# Patient Record
Sex: MALE | Race: WHITE | URBAN - METROPOLITAN AREA
[De-identification: names, ages, dates, MRNs, and addresses within clinical notes are randomized per-mention and may not be internally consistent; named-entity substitution may affect disease eponyms.]

---

## 2017-04-19 ENCOUNTER — TRANSFERRED RECORDS (OUTPATIENT)
Dept: HEALTH INFORMATION MANAGEMENT | Facility: CLINIC | Age: 67
End: 2017-04-19

## 2017-04-25 ENCOUNTER — TRANSFERRED RECORDS (OUTPATIENT)
Dept: HEALTH INFORMATION MANAGEMENT | Facility: CLINIC | Age: 67
End: 2017-04-25

## 2017-04-26 ENCOUNTER — PRE VISIT (OUTPATIENT)
Dept: OTOLARYNGOLOGY | Facility: CLINIC | Age: 67
End: 2017-04-26

## 2017-04-26 NOTE — TELEPHONE ENCOUNTER
1.  Date/reason for appt: 5/18/17 Cholesteatoma of R  2.  Referring provider: KAVON DEE   3.  Call to patient (Yes / No - short description): no, referral   4.  Previous care at / records requested from: [facility]   5.  Other: Records received from [facility].   Included  Office notes: 9/21/15, 11/21/16, 12/12/16, 4/19/17  Other: labs

## 2017-05-16 DIAGNOSIS — H71.90 CHOLESTEATOMA: Primary | ICD-10-CM

## 2017-05-17 NOTE — TELEPHONE ENCOUNTER
Records received from [facility].   Included  Op/Procedure notes: right ear surgery on 4/11/05  Other: audiogram on 3/16/05

## 2017-05-18 ENCOUNTER — OFFICE VISIT (OUTPATIENT)
Dept: AUDIOLOGY | Facility: CLINIC | Age: 67
End: 2017-05-18

## 2017-05-18 ENCOUNTER — OFFICE VISIT (OUTPATIENT)
Dept: OTOLARYNGOLOGY | Facility: CLINIC | Age: 67
End: 2017-05-18

## 2017-05-18 VITALS — BODY MASS INDEX: 32.07 KG/M2 | WEIGHT: 224 LBS | HEIGHT: 70 IN

## 2017-05-18 DIAGNOSIS — H90.5 SENSORINEURAL HEARING LOSS OF LEFT EAR: Primary | ICD-10-CM

## 2017-05-18 DIAGNOSIS — H90.71 MIXED HEARING LOSS OF RIGHT EAR: ICD-10-CM

## 2017-05-18 DIAGNOSIS — H92.11 OTORRHEA, RIGHT: ICD-10-CM

## 2017-05-18 DIAGNOSIS — H71.91 CHOLESTEATOMA, RIGHT: Primary | ICD-10-CM

## 2017-05-18 DIAGNOSIS — H71.90 CHOLESTEATOMA: ICD-10-CM

## 2017-05-18 PROBLEM — C44.621 SQUAMOUS CELL CARCINOMA OF HAND: Status: ACTIVE | Noted: 2017-01-05

## 2017-05-18 RX ORDER — WARFARIN SODIUM 2 MG/1
TABLET ORAL
COMMUNITY
Start: 2017-04-12

## 2017-05-18 RX ORDER — CEFADROXIL 500 MG/1
500 CAPSULE ORAL
COMMUNITY
Start: 2016-12-22 | End: 2017-05-25

## 2017-05-18 RX ORDER — CHLORAL HYDRATE 500 MG
2 CAPSULE ORAL EVERY EVENING
COMMUNITY

## 2017-05-18 RX ORDER — ASPIRIN 81 MG/1
81 TABLET, CHEWABLE ORAL EVERY EVENING
COMMUNITY
Start: 2016-12-13

## 2017-05-18 RX ORDER — SIMVASTATIN 40 MG
TABLET ORAL
COMMUNITY
Start: 2016-12-13

## 2017-05-18 RX ORDER — AMLODIPINE BESYLATE AND BENAZEPRIL HYDROCHLORIDE 2.5; 1 MG/1; MG/1
1 CAPSULE ORAL EVERY EVENING
COMMUNITY
Start: 2016-12-13

## 2017-05-18 RX ORDER — ACETAMINOPHEN 500 MG
500-1000 TABLET ORAL EVERY 4 HOURS PRN
COMMUNITY
Start: 2013-03-15

## 2017-05-18 RX ORDER — CHOLECALCIFEROL (VITAMIN D3) 50 MCG
2000 TABLET ORAL EVERY EVENING
COMMUNITY
Start: 2017-05-10

## 2017-05-18 RX ORDER — SILDENAFIL 100 MG/1
100 TABLET, FILM COATED ORAL
COMMUNITY
End: 2017-05-25

## 2017-05-18 ASSESSMENT — PAIN SCALES - GENERAL: PAINLEVEL: NO PAIN (0)

## 2017-05-18 NOTE — NURSING NOTE
Teaching Flowsheet - ENT   Relevant Diagnosis: cholesteatoma  Teaching Topic:right conversion or radical mastoidectomy  Person(s) involved in teaching:  Patient- wife     Motivation Level:  Asks Questions:   Yes  Eager to Learn:   Yes  Cooperative:   Yes  Receptive (willing/able to accept information):   Yes  Comments: Reviewed pre-op H and P,  NPO prior to  surgery,  pre-op scrub (given Hibiclens)  Reviewed post-op  cares including  ear/wound care, activity and pain.     Patient demonstrates understanding of the following:  Reason for the appointment, diagnosis and treatment plan:   Yes  Knowledge of proper use of medications and conditions for which they are ordered (with special attention to potential side effects or drug interactions):  stop aspirin products 1 week before surgery Yes. Pt on coumadin- pt will update  PCP - this is who orders/manages medication.   Which situations necessitate calling provider and whom to contact:   Yes  Nutritional needs and diet plan:   Yes  Pain management techniques:   Yes  Patient instructed on hand hygiene:  Yes  How and/when to access community resources:   Yes     Infection Prevention:  Patient   demonstrates understanding of the following:  Surgical procedure site care taught Yes  Signs and symptoms of infection taught Yes  Wound care taught Yes  Instructional Materials Used/Given: pre- op booklet, ear surgery  handout and verbal  Instruction.

## 2017-05-18 NOTE — LETTER
5/18/2017       RE: Ed Cain  [address] [facility] [address]     Dear Colleague,    Thank you for referring your patient, Ed Cain, to the [facility] EAR NOSE AND THROAT at [facility]. Please see a copy of my visit note below.    Ed Cain is seen in consultation from Dr. Harley Rey.  He is a 66-year-old male with history of right-sided ear disease. He had a mastoidectomy done some time in childhood. He then had a revision to canal wall down mastoidectomy with PORP placement in 2005 with Dr. Camarena. Since then, he gets twice yearly mastoid bowl cleanings. In the last 7-8 months, he has had more pain with cleanings. He feels like around this time, he also started to notice some balance disturbance when turning quickly. He does not have true vertigo. He denies having any otorrhea.     PAST MEDICAL HISTORY:  1. Hypertension  2. Cerebral aneurysms s/p coiling    PAST SURGICAL HISTORY:   1. Mastoidectomy in childhood, revision to canal wall down in 2005 with Dr. Camarena (PORP placed)  2. Aneurysm coiling    MEDICATIONS:  Current Outpatient Prescriptions   Medication     acetaminophen (TYLENOL) 500 MG tablet     amLODIPine-benazepril (LOTREL) 2.5-10 MG per capsule     aspirin 81 MG chewable tablet     Cholecalciferol (VITAMIN D) 2000 UNITS tablet     fish oil-omega-3 fatty acids 1000 MG capsule     warfarin (COUMADIN) 2 MG tablet     sildenafil (REVATIO/VIAGRA) 100 MG cap/tab     simvastatin (ZOCOR) 40 MG tablet     cefadroxil (DURICEF) 500 MG capsule     No current facility-administered medications for this visit.      SOCIAL HISTORY: he is  and accompanied by his wife today. They live in [place], [place]. 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    FAMILY HISTORY: no history of ear disease or hearing loss.    Patient Supplied Answers to Review of Systems  [facility] ENT ROS 5/18/2017   Ears, Nose, Throat Hearing loss   The remainder of the 10 point review of  systems is otherwise negative.    Physical examination:  Constitutional:  In no acute distress, appears stated age  Eyes:  Extraocular movements intact, no spontaneous nystagmus  Ears:  Both ears examined under the microscope.  The auricles are normal. The left ear canal has a scant amount of wax. The tympanic membrane is intact without retraction and no middle ear effusion. On the right, there is a mastoid bowl with a small amount of debris medially. This was easily removed. There is some purulence overlying the TM. This was cultured and then suctioned. There is a superior perforation through which one can see the eustachian tube. There is squamous debris extending into the epitympanum, a small amount of which was suctioned. On the inferior aspect of the TM, the PORP is extruding.  Respiratory:  No increased work of breathing, wheezing or stridor  Musculoskeletal:  Good upper extremity strength  Skin:  No rashes on the head and neck  Neurologic:  House Brackman 1/6 bilaterally, ambulating normally  Psychiatric:  Alert, normal affect, answering questions appropriately    Audiogram: Left normal sloping to moderately severe sensorineural hearing loss with SRT of 10 dB and WRS of 96% at 60 dB. Right severe sloping to profound mixed hearing loss with SRT of 80 dB and WRS of 96% at 110 dB. Tympanogram is type B on the right and type A on the left. Left ipsilateral reflex is present, others could not be tested. Addis is negative.     Imaging: Outside CT temporal bone is reviewed. This shows changes consistent with a canal wall down mastoidectomy on the right. There is complete opacification of the middle ear space. The tympanic segment of the facial nerve is dehiscent. Soft tissue density abuts the oval window. Left ear essentially normal.    Assessment and plan:  Ed Cain is a 66-year-old male with recurrent cholesteatoma. We discussed the imaging and exam findings with the patient today. A culture of the drainage  was obtained today. We discussed that he may require a prolonged period of antibiotics based on the culture results. There is some concern for osteomyelitis given the length of infection and ongoing pain. We would also like to obtain a nuclear medicine bone scan to evaluate for this. Ultimately, he will require surgery for removal of the cholesteatoma which is present in the epitympanum. This would consist of conversion to a radical mastoidectomy given his very clear eustachian tube dysfunction. We discussed that he is at a higher risk for permanent, profound hearing loss and postoperative vertigo as there does appear to be soft tissue density at the oval window and potentially an absence of the footplate. We explained that if damage to the inner ear were to occur, that he would need balance therapy and would likely be out from work for about 6 weeks. The patient had his questions answered and was amenable to the above. We will call him with the results of the culture and start appropriate therapy if indicated. We will plan to see him back in about a month and try to obtain a bone scan in the interim. His questions were answered to his satisfaction.    Saroj Valentin MD  Resident Physician  Otolaryngology - Head & Neck Surgery     I, Hanny Isaacs, saw this patient with the resident/fellow and agree with the resident s findings and plan of care as documented in the resident s/fellow s note. I was present for the entire procedure.    Hanny Isaacs MD

## 2017-05-18 NOTE — PATIENT INSTRUCTIONS
You will be called with the results of your ear culture.  You will have a bone scan done- you will be called with the result.  Patient to review pre operative information.  You will have a consult with our Preoperative Assessment Clinic within 30 days of the procedure for your pre op history and physical.    Patient to avoid blood thinning medications 1 week prior to surgery (Ibuprofen, Aleve, Aspirin, fish oil )   Use the antiseptic scrub as directed.   You will need to schedule a post operative appointment for 3 weeks after surgery    Patient to call the ENT clinic with further questions or concerns:   ENT Triage Nurse  [phone] option 3  ENT appointment scheduling [phone] option 1  ENT surgery scheduling, Salma [phone]  ENT Nurse Dee [phone]   Please call our clinic for any questions,concerns,or worsening symptoms.      Clinic #[phone]       Option 3  for the triage nurse.

## 2017-05-18 NOTE — MR AVS SNAPSHOT
After Visit Summary   2017    Ed Cain    MRN: [id number]           Patient Information     Date Of Birth          1950        Visit Information        Provider Department      2017 11:30 AM Jenni Garrison AuD M [facility] Audiology        Today's Diagnoses     Sensorineural hearing loss of left ear    -  1    Mixed hearing loss of right ear           Follow-ups after your visit        Who to contact     Please call your clinic at [phone] to:    Ask questions about your health    Make or cancel appointments    Discuss your medicines    Learn about your test results    Speak to your doctor   If you have compliments or concerns about an experience at your clinic, or if you wish to file a complaint, please contact [facility] Patient Relations at [phone] or email us at [email or web address]         Additional Information About Your Visit        MyChart Information     1[employer] Online is an electronic gateway that provides easy, online access to your medical records. With 1[employer] Online, you can request a clinic appointment, read your test results, renew a prescription or communicate with your care team.     To sign up for M_SOLUTIONt visit the website at [email or web address]   You will be asked to enter the access code listed below, as well as some personal information. Please follow the directions to create your username and password.     Your access code is: [email or web address]  Expires: 2017  8:58 AM     Your access code will  in 90 days. If you need help or a new code, please contact your [facility] or call [phone] for assistance.        Care EveryWhere ID     This is your Care EveryWhere ID. This could be used by other organizations to access your [facility] medical records  [id number]         Blood Pressure from Last 3 Encounters:   No data found for BP    Weight from Last 3 Encounters:   17 101.6 kg  (224 lb)              We Performed the Following     AUDIOGRAM/TYMPANOGRAM - INTERFACE     [facility] Audiometry Thrshld Eval & Speech Recog ([address]672)     Reduced 52 - Tymps / Reflex   (72529)     Addis   ([id number])        Primary Care Provider Office Phone # Fax #    Alfred Snowden [phone]       [address]  [facility] [address]        Thank you!     Thank you for choosing [facility] AUDIOLOGY  for your care. Our goal is always to provide you with excellent care. Hearing back from our patients is one way we can continue to improve our services. Please take a few minutes to complete the written survey that you may receive in the mail after your visit with us. Thank you!             Your Updated Medication List - Protect others around you: Learn how to safely use, store and throw away your medicines at www.disposemymeds.org.          This list is accurate as of: 5/18/17  1:02 PM.  Always use your most recent med list.                   Brand Name Dispense Instructions for use    acetaminophen 500 MG tablet    TYLENOL     Take 500-1,000 mg by mouth       amLODIPine-benazepril 2.5-10 MG per capsule    LOTREL     Take 1 capsule by mouth       aspirin 81 MG chewable tablet      Take 81 mg by mouth       cefadroxil 500 MG capsule    DURICEF     Take 500 mg by mouth Reported on 5/18/2017       fish oil-omega-3 fatty acids 1000 MG capsule      Take 1,000 mg by mouth       sildenafil 100 MG cap/tab    REVATIO/VIAGRA     Take 100 mg by mouth       simvastatin 40 MG tablet    ZOCOR     TAKE ONE TABLET BY MOUTH IN EVENING       vitamin D 2000 UNITS tablet      Take 2,000 Units by mouth       warfarin 2 MG tablet    COUMADIN     Lab 4/12/17: Take 6 mg daily. This dose may change.

## 2017-05-18 NOTE — PROGRESS NOTES
Ed Cain is seen in consultation from Dr. Harley Rey.  He is a 66-year-old male with history of right-sided ear disease. He had a mastoidectomy done some time in childhood. He then had a revision to canal wall down mastoidectomy with PORP placement in 2005 with Dr. Camarena. Since then, he gets twice yearly mastoid bowl cleanings. In the last 7-8 months, he has had more pain with cleanings. He feels like around this time, he also started to notice some balance disturbance when turning quickly. He does not have true vertigo. He denies having any otorrhea.     PAST MEDICAL HISTORY:  1. Hypertension  2. Cerebral aneurysms s/p coiling    PAST SURGICAL HISTORY:   1. Mastoidectomy in childhood, revision to canal wall down in 2005 with Dr. Camarena (PORP placed)  2. Aneurysm coiling    MEDICATIONS:  Current Outpatient Prescriptions   Medication     acetaminophen (TYLENOL) 500 MG tablet     amLODIPine-benazepril (LOTREL) 2.5-10 MG per capsule     aspirin 81 MG chewable tablet     Cholecalciferol (VITAMIN D) 2000 UNITS tablet     fish oil-omega-3 fatty acids 1000 MG capsule     warfarin (COUMADIN) 2 MG tablet     sildenafil (REVATIO/VIAGRA) 100 MG cap/tab     simvastatin (ZOCOR) 40 MG tablet     cefadroxil (DURICEF) 500 MG capsule     No current facility-administered medications for this visit.      SOCIAL HISTORY: he is  and accompanied by his wife today. They live in [place], [place]. 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    FAMILY HISTORY: no history of ear disease or hearing loss.    Patient Supplied Answers to Review of Systems   ENT ROS 5/18/2017   Ears, Nose, Throat Hearing loss   The remainder of the 10 point review of systems is otherwise negative.    Physical examination:  Constitutional:  In no acute distress, appears stated age  Eyes:  Extraocular movements intact, no spontaneous nystagmus  Ears:  Both ears examined under the microscope.  The auricles are normal. The left ear canal has a scant  amount of wax. The tympanic membrane is intact without retraction and no middle ear effusion. On the right, there is a mastoid bowl with a small amount of debris medially. This was easily removed. There is some purulence overlying the TM. This was cultured and then suctioned. There is a superior perforation through which one can see the eustachian tube. There is squamous debris extending into the epitympanum, a small amount of which was suctioned. On the inferior aspect of the TM, the PORP is extruding.  Respiratory:  No increased work of breathing, wheezing or stridor  Musculoskeletal:  Good upper extremity strength  Skin:  No rashes on the head and neck  Neurologic:  House Brackman 1/6 bilaterally, ambulating normally  Psychiatric:  Alert, normal affect, answering questions appropriately    Audiogram: Left normal sloping to moderately severe sensorineural hearing loss with SRT of 10 dB and WRS of 96% at 60 dB. Right severe sloping to profound mixed hearing loss with SRT of 80 dB and WRS of 96% at 110 dB. Tympanogram is type B on the right and type A on the left. Left ipsilateral reflex is present, others could not be tested. Addis is negative.     Imaging: Outside CT temporal bone is reviewed. This shows changes consistent with a canal wall down mastoidectomy on the right. There is complete opacification of the middle ear space. The tympanic segment of the facial nerve is dehiscent. Soft tissue density abuts the oval window. Left ear essentially normal.    Assessment and plan:  Ed Cain is a 66-year-old male with recurrent cholesteatoma. We discussed the imaging and exam findings with the patient today. A culture of the drainage was obtained today. We discussed that he may require a prolonged period of antibiotics based on the culture results. There is some concern for osteomyelitis given the length of infection and ongoing pain. We would also like to obtain a nuclear medicine bone scan to evaluate for  this. Ultimately, he will require surgery for removal of the cholesteatoma which is present in the epitympanum. This would consist of conversion to a radical mastoidectomy given his very clear eustachian tube dysfunction. We discussed that he is at a higher risk for permanent, profound hearing loss and postoperative vertigo as there does appear to be soft tissue density at the oval window and potentially an absence of the footplate. We explained that if damage to the inner ear were to occur, that he would need balance therapy and would likely be out from work for about 6 weeks. The patient had his questions answered and was amenable to the above. We will call him with the results of the culture and start appropriate therapy if indicated. We will plan to see him back in about a month and try to obtain a bone scan in the interim. His questions were answered to his satisfaction.    Saroj Valentin MD  Resident Physician  Otolaryngology - Head & Neck Surgery     I, Hanny Isaacs, saw this patient with the resident/fellow and agree with the resident s findings and plan of care as documented in the resident s/fellow s note. I was present for the entire procedure.    Hanny Isaacs MD

## 2017-05-18 NOTE — PROGRESS NOTES
AUDIOLOGY REPORT    SUMMARY: Audiology visit completed. See audiogram for results.      RECOMMENDATIONS: Follow-up with ENT. Patient is a bilateral hearing aid candidate pending medical clearance. He will schedule a consultation if desired.    Jodi Chin M.A.  Audiology Extern  Temporary License #[id number]    I was present with the patient for the entire Audiology appointment including all procedures/testing performed by the AuD student, and agree with the student s assessment and plan as documented.      Raghavendra Mooney, [facility]  Licensed Audiologist  [place] #[id number]

## 2017-05-18 NOTE — MR AVS SNAPSHOT
After Visit Summary   5/18/2017    Ed Cain    MRN: [id number]           Patient Information     Date Of Birth          1950        Visit Information        Provider Department      5/18/2017 12:45 PM Hanny Isaacs MD [facility] Ear Nose and Throat        Today's Diagnoses     Otorrhea    -  1      Care Instructions       You will be called with the results of your ear culture.  You will have a bone scan done- you will be called with the result.  Patient to review pre operative information.  You will have a consult with our Preoperative Assessment Clinic within 30 days of the procedure for your pre op history and physical.    Patient to avoid blood thinning medications 1 week prior to surgery (Ibuprofen, Aleve, Aspirin, fish oil )   Use the antiseptic scrub as directed.   You will need to schedule a post operative appointment for 3 weeks after surgery    Patient to call the ENT clinic with further questions or concerns:   ENT Triage Nurse  [phone] option 3  ENT appointment scheduling [phone] option 1  ENT surgery scheduling, Salma [phone]  ENT Nurse Dee [phone]   Please call our clinic for any questions,concerns,or worsening symptoms.      Clinic #[phone]       Option 3  for the triage nurse.        Follow-ups after your visit        Your next 10 appointments already scheduled     May 24, 2017 10:30 AM CDT   NM INJECT 3PH BONE SCAN with [address], Nuclear Medicine ([facility])    [address] [place] [id number] [phone]            May 24, 2017  1:30 PM CDT   NM BONE SCAN 3 PHASE with [address], Nuclear Medicine ([facility])    [address] [place] [id number] [phone]           Please bring a list of your medicines to the exam. (Include vitamins, minerals and over-the-counter drugs.) You should wear  comfortable clothes. Leave your valuables at home. Please bring related prior results and films. Tell your doctor:   If you are breastfeeding or may be pregnant.   If you have had a barium test within the past few days. Barium may change the results of certain exams.   If you think you may need sedation (medicine to help you relax).  You may eat and drink as normal.  Drink plenty of fluids and empty bladder frequently between injection and scans.              Future tests that were ordered for you today     Open Future Orders        Priority Expected Expires Ordered    NM Bone Scan 3 Phase Routine  2018            Who to contact     Please call your clinic at [phone] to:    Ask questions about your health    Make or cancel appointments    Discuss your medicines    Learn about your test results    Speak to your doctor   If you have compliments or concerns about an experience at your clinic, or if you wish to file a complaint, please contact [facility] Patient Relations at [phone] or email us at [email or web address]         Additional Information About Your Visit        Dakwak Information     Dakwak is an electronic gateway that provides easy, online access to your medical records. With Dakwak, you can request a clinic appointment, read your test results, renew a prescription or communicate with your care team.     To sign up for Dakwak visit the website at [email or web address]   You will be asked to enter the access code listed below, as well as some personal information. Please follow the directions to create your username and password.     Your access code is: [email or web address]  Expires: 2017  8:58 AM     Your access code will  in 90 days. If you need help or a new code, please contact your [facility] or call [phone] for assistance.        Care EveryWhere ID     This is your Care EveryWhere ID. This  "could be used by other organizations to access your [facility] medical records  [id number]        Your Vitals Were     Height BMI (Body Mass Index)                1.765 m (5' 9.5\") 32.6 kg/m2           Blood Pressure from Last 3 Encounters:   No data found for BP    Weight from Last 3 Encounters:   05/18/17 101.6 kg (224 lb)              We Performed the Following     Ear Culture Aerobic Bacterial     Fungus Culture, non-blood        Primary Care Provider Office Phone # Fax Sukumar Snowden [phone]       [address] [address]        Thank you!     Thank you for choosing [facility] EAR NOSE AND THROAT  for your care. Our goal is always to provide you with excellent care. Hearing back from our patients is one way we can continue to improve our services. Please take a few minutes to complete the written survey that you may receive in the mail after your visit with us. Thank you!             Your Updated Medication List - Protect others around you: Learn how to safely use, store and throw away your medicines at www.disposemymeds.org.          This list is accurate as of: 5/18/17  2:07 PM.  Always use your most recent med list.                   Brand Name Dispense Instructions for use    acetaminophen 500 MG tablet    TYLENOL     Take 500-1,000 mg by mouth       amLODIPine-benazepril 2.5-10 MG per capsule    LOTREL     Take 1 capsule by mouth       aspirin 81 MG chewable tablet      Take 81 mg by mouth       cefadroxil 500 MG capsule    DURICEF     Take 500 mg by mouth Reported on 5/18/2017       fish oil-omega-3 fatty acids 1000 MG capsule      Take 1,000 mg by mouth       sildenafil 100 MG cap/tab    REVATIO/VIAGRA     Take 100 mg by mouth       simvastatin 40 MG tablet    ZOCOR     TAKE ONE TABLET BY MOUTH IN EVENING       vitamin D 2000 UNITS tablet      Take 2,000 Units by mouth       warfarin 2 MG tablet    COUMADIN     Lab 4/12/17: Take 6 mg daily. This " dose may change.

## 2017-05-20 LAB
BACTERIA SPEC CULT: ABNORMAL
MICRO REPORT STATUS: ABNORMAL
SPECIMEN SOURCE: ABNORMAL

## 2017-05-24 ENCOUNTER — CARE COORDINATION (OUTPATIENT)
Dept: OTOLARYNGOLOGY | Facility: CLINIC | Age: 67
End: 2017-05-24

## 2017-05-24 DIAGNOSIS — H92.10 OTORRHEA: Primary | ICD-10-CM

## 2017-05-24 RX ORDER — OFLOXACIN 3 MG/ML
5 SOLUTION AURICULAR (OTIC) 2 TIMES DAILY
Qty: 7 ML | Refills: 1 | Status: SHIPPED | OUTPATIENT
Start: 2017-05-24 | End: 2017-06-07

## 2017-05-25 ENCOUNTER — HOSPITAL ENCOUNTER (OUTPATIENT)
Dept: NUCLEAR MEDICINE | Facility: CLINIC | Age: 67
Setting detail: NUCLEAR MEDICINE
Discharge: HOME | End: 2017-05-25
Attending: OTOLARYNGOLOGY | Admitting: OTOLARYNGOLOGY
Payer: COMMERCIAL

## 2017-05-25 ENCOUNTER — HOSPITAL ENCOUNTER (OUTPATIENT)
Dept: NUCLEAR MEDICINE | Facility: CLINIC | Age: 67
Setting detail: NUCLEAR MEDICINE
End: 2017-05-25
Attending: OTOLARYNGOLOGY
Payer: COMMERCIAL

## 2017-05-25 ENCOUNTER — OFFICE VISIT (OUTPATIENT)
Dept: SURGERY | Facility: CLINIC | Age: 67
End: 2017-05-25

## 2017-05-25 ENCOUNTER — ANESTHESIA EVENT (OUTPATIENT)
Dept: SURGERY | Facility: CLINIC | Age: 67
End: 2017-05-25

## 2017-05-25 ENCOUNTER — ALLIED HEALTH/NURSE VISIT (OUTPATIENT)
Dept: SURGERY | Facility: CLINIC | Age: 67
End: 2017-05-25

## 2017-05-25 VITALS
TEMPERATURE: 98.1 F | HEART RATE: 70 BPM | HEIGHT: 71 IN | DIASTOLIC BLOOD PRESSURE: 78 MMHG | RESPIRATION RATE: 16 BRPM | SYSTOLIC BLOOD PRESSURE: 135 MMHG | WEIGHT: 225.8 LBS | OXYGEN SATURATION: 96 % | BODY MASS INDEX: 31.61 KG/M2

## 2017-05-25 DIAGNOSIS — H71.91 CHOLESTEATOMA, RIGHT: ICD-10-CM

## 2017-05-25 DIAGNOSIS — H92.10 OTORRHEA: ICD-10-CM

## 2017-05-25 DIAGNOSIS — Z01.818 PREOP EXAMINATION: Primary | ICD-10-CM

## 2017-05-25 DIAGNOSIS — Z86.711 HISTORY OF PULMONARY EMBOLISM: ICD-10-CM

## 2017-05-25 LAB
ANION GAP SERPL CALCULATED.3IONS-SCNC: 8 MMOL/L (ref 3–14)
BUN SERPL-MCNC: 12 MG/DL (ref 7–30)
CALCIUM SERPL-MCNC: 8.8 MG/DL (ref 8.5–10.1)
CHLORIDE SERPL-SCNC: 108 MMOL/L (ref 94–109)
CO2 SERPL-SCNC: 25 MMOL/L (ref 20–32)
CREAT SERPL-MCNC: 0.8 MG/DL (ref 0.66–1.25)
ERYTHROCYTE [DISTWIDTH] IN BLOOD BY AUTOMATED COUNT: 12.4 % (ref 10–15)
GFR SERPL CREATININE-BSD FRML MDRD: >90 ML/MIN/1.7M2
GLUCOSE SERPL-MCNC: 91 MG/DL (ref 70–99)
HCT VFR BLD AUTO: 42.2 % (ref 40–53)
HGB BLD-MCNC: 15.3 G/DL (ref 13.3–17.7)
INR PPP: 2.7 (ref 0.86–1.14)
MCH RBC QN AUTO: 34.5 PG (ref 26.5–33)
MCHC RBC AUTO-ENTMCNC: 36.3 G/DL (ref 31.5–36.5)
MCV RBC AUTO: 95 FL (ref 78–100)
PLATELET # BLD AUTO: 165 10E9/L (ref 150–450)
POTASSIUM SERPL-SCNC: 4.5 MMOL/L (ref 3.4–5.3)
RBC # BLD AUTO: 4.44 10E12/L (ref 4.4–5.9)
SODIUM SERPL-SCNC: 141 MMOL/L (ref 133–144)
WBC # BLD AUTO: 5.5 10E9/L (ref 4–11)

## 2017-05-25 PROCEDURE — 78315 BONE IMAGING 3 PHASE: CPT

## 2017-05-25 PROCEDURE — 34300033 ZZH RX 343: Performed by: OTOLARYNGOLOGY

## 2017-05-25 PROCEDURE — A9503 TC99M MEDRONATE: HCPCS | Performed by: OTOLARYNGOLOGY

## 2017-05-25 RX ORDER — TC 99M MEDRONATE 20 MG/10ML
20-30 INJECTION, POWDER, LYOPHILIZED, FOR SOLUTION INTRAVENOUS ONCE
Status: COMPLETED | OUTPATIENT
Start: 2017-05-25 | End: 2017-05-25

## 2017-05-25 RX ADMIN — TC 99M MEDRONATE 22.5 MCI.: 20 INJECTION, POWDER, LYOPHILIZED, FOR SOLUTION INTRAVENOUS at 13:30

## 2017-05-25 ASSESSMENT — LIFESTYLE VARIABLES: TOBACCO_USE: 1

## 2017-05-25 NOTE — ANESTHESIA PREPROCEDURE EVALUATION
Anesthesia Evaluation     . Pt has had prior anesthetic. Type: General    No history of anesthetic complications          ROS/MED HX    ENT/Pulmonary:     (+)SHELLY risk factors snores loudly, hypertension, obese, tobacco use, Current use 1/2 packs/day  , . .    Neurologic:     (+)CVA date: 2012 with deficits- mild central tremor, other neuro History of ruptured aneurysm, s/p prolonged hospital stay, aneurysm coiling X2,  shunt, now removed    Cardiovascular:     (+) Dyslipidemia, hypertension----. Taking blood thinners Pt has received instructions: Instructions Given to patient: Will remain on ASA. Local doctor to provide Lovenox bridge. . . :. . Previous cardiac testing date:results:date: results:ECG reviewed date:5/25/17 results:NSR date: results:          METS/Exercise Tolerance:  >4 METS   Hematologic:     (+) History of blood clots pt is anticoagulated, -     (-) History of Transfusion   Musculoskeletal:   (+) arthritis, , , other musculoskeletal- herniated disk      GI/Hepatic:     (+) GERD Other,       Renal/Genitourinary:  - ROS Renal section negative       Endo:     (+) Obesity, .      Psychiatric:  - neg psychiatric ROS       Infectious Disease:  - neg infectious disease ROS       Malignancy:      - no malignancy   Other:    (+) No chance of pregnancy C-spine cleared: N/A, no H/O Chronic Pain,no other significant disability                    Physical Exam      Airway   Mallampati: III  TM distance: >3 FB  Neck ROM: limited    Dental   (+) missing    Cardiovascular   Rhythm and rate: regular and normal      Pulmonary    breath sounds clear to auscultation    Other findings: For further details of assessment, testing, and physical exam please see H and P completed on same date.           PAC Discussion and Assessment    ASA Classification: 3  Case is suitable for: ASC  Anesthetic techniques and relevant risks discussed: GA  Invasive monitoring and risk discussed: No  Types:   Possibility and Risk of blood  transfusion discussed: No  NPO instructions given:   Additional anesthetic preparation and risks discussed:   Needs early admission to pre-op area:   Other:     PAC Resident/NP Anesthesia Assessment:  Ed Cain is a 66 year old male scheduled to undergo right ear conversion to radical mastoidectomy with Dr. Isaacs on date TBD. He has the following specific operative considerations:   - RCRI : 0.9% risk of major adverse cardiac event.   - Risk of PONV score = 0.  If > 2, anti-emetic intervention recommended.                --Right side recurrent cholesteatoma. s/p multiple procedures over time. Above procedure now planned. Hearing loss right ear.   --HLD. Simvastatin. HTN. Will hold Lotrel on DOS. ASA 81 mg and will remain on until DOS. No other cardiac history or symptoms. Good activity tolerance. EKG NSR.   --Current smoker 1/2 PPD X 45 years. Denies pulmonary symptoms. No meds. Denies concern/symptoms for SHELLY. Able to lie flat.   --History of PE in 2011 after leg injury. Past history of multiple DVTs. Chronically anticoagulated with Coumadin. He is well followed by PCP, Dr. Snowden who will provided instructions for Coumadin hold and Lovenox bridge. Patient has done this several times in the past.    --History of ruptured cerebral aneurysm in 2011, s/p coiling X 2, need for  shunt, now removed and prolonged hospital stay and rehab. 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     --ETOH use. Two double shot drinks daily.    --Patient approved for ASC. Final scheduling details per ENT.     Patient was discussed with Dr Mcguire.      Reviewed and Signed by PAC Mid-Level Provider/Resident  Mid-Level Provider/Resident: AMGUI Uriarte, CNS  Date: 5/25/17  Time: 7:45am    Attending Anesthesiologist Anesthesia Assessment:  I have examined the patient and reviewed the chart.  I have discussed the patient with the AMADOR and concur with her assessment.  The patient is very active with an activity level > 4 METS and no  cardiac symptoms.  He has no known cardiac disease and his EKG is normal.  He does smoke but has no known COPD, SHELLY or RADz, uses no MDIs, and has no limitations on his activity.  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  This was embolized in 2012 and again in 2013.  He currently has no neurologic symptoms. (He had ventriculostomy during hospitalization but no  shunt).  He had a PE following development of DVT after leg injury.  He is on chronic warfarin therapy.  His PCP is managing withholding warfarin and bridging with LMWH  In anticipation of surgery.    PE:  MPC 3, decreased extension, 4 FBTMD.  Temporary crown left maxillary molar (but his is to be replaced this week).  Lungs clear.  CVS  RRR with no murmur    No further testing is necessary at this time beyond INR.    Patient is suitable for GA in ASC.    Final plan per anesthesiologist the day of surgery.    Reviewed and Signed by PAC Anesthesiologist  Anesthesiologist: Alonzo Mcguire MD  Date: 05/25/2017  Time:   Pass/Fail:   Disposition:     PAC Pharmacist Assessment:        Pharmacist:   Date:   Time:                           .

## 2017-05-25 NOTE — PROGRESS NOTES
Preoperative Assessment Center medication history for May 25, 2017 is complete.    See Epic admission navigator for allergy information, pharmacy and prior to admission medications.    Operating room staff will still need to confirm medications and last dose information on day of surgery.     Medication history interview sources:  Patient Interview    Changes made to PTA medication list (reason)  Added: None    Deleted:   -- Cefadroxil - patient finished abx a long time ago    Changed:   -- updated fish oil dose per patient     Additional medication history information (including reliability of information, actions taken by pharmacist):  -- No implantable devices  -- No recent (within 30 days) courses of antibiotics       Prior to Admission medications    Medication Sig Last Dose Taking? Auth Provider   ofloxacin (FLOXIN) 0.3 % otic solution Place 5 drops into the right ear 2 times daily for 14 days Taking Yes Hanny Isaacs MD   amLODIPine-benazepril (LOTREL) 2.5-10 MG per capsule Take 1 capsule by mouth daily  Taking Yes Reported, Patient   aspirin 81 MG chewable tablet Take 81 mg by mouth every evening   Yes Reported, Patient   Cholecalciferol (VITAMIN D) 2000 UNITS tablet Take 2,000 Units by mouth every evening  Taking Yes Reported, Patient   fish oil-omega-3 fatty acids 1000 MG capsule Take 2 g by mouth every evening  Taking Yes Reported, Patient   warfarin (COUMADIN) 2 MG tablet Lab 4/12/17: Take 6 mg daily. This dose may change. Taking Yes Reported, Patient   simvastatin (ZOCOR) 40 MG tablet TAKE ONE TABLET BY MOUTH IN EVENING Taking Yes Reported, Patient   acetaminophen (TYLENOL) 500 MG tablet Take 500-1,000 mg by mouth every 4 hours as needed  Not Taking  Reported, Patient         Medication history completed by:   Remy Bolivar, Pharm.D, BCPS

## 2017-05-25 NOTE — H&P
Pre-Operative H & P     CC:  Preoperative exam to assess for increased cardiopulmonary risk while undergoing surgery and anesthesia.    Date of Encounter: 5/25/2017  Primary Care Physician:  Alfred Snowden  Ed Cain is a 66 year old male who presents for pre-operative H & P in preparation for right ear conversion to radical mastoidectomy with Dr. Isaacs on date TBD at [facility]. History is obtained from the patient and wife.     Patient with longstanding history of right ear disease, s/p mastoidectomy in childhood. In 2005 he had a revision but has required mastoid bowl cleanings at least twice a year. Most recently he was re-evaluated by Dr. Isaacs with concerns for pain with his cleanings and some new balance disturbance when turning quickly. He was counseled for above procedure.  Patient's history is otherwise significant for history of DVT/PE, with PE occurring in 2011. He is chronically anticoagulated and is closely followed by his PCP , Dr. Snowden. In addition, he suffered a ruptured cerebral aneurysm in 2012, with aneurysm coiling X 2, need for  shunt and prolonged hospital stay. He fully recovered except for mild central hand tremor.   Past Medical History  Past Medical History:   Diagnosis Date     Benign essential hypertension 4/22/2014     Cerebral aneurysm 3/14/2013     Cholesteatoma      DVT (deep venous thrombosis) (H) 2011    multiple     Ear disease, right      Hearing loss     right side     Long term current use of anticoagulant therapy 6/7/2010     Multiple-type hyperlipidemia 4/29/2016     Obstructive hydrocephalus      Pulmonary embolism (H) 2011     Subarachnoid hemorrhage (H) 2012       Past Surgical History  Past Surgical History:   Procedure Laterality Date     Aneurysm coiling  2012, 2013     Canal wall down mastoidectomy with PORP placement  2005     IMPLANT SHUNT VENTRICULOPERITONEAL  2012     MASTOIDECTOMY      childhood       Hx of Blood  transfusions/reactions: Denies.      Hx of abnormal bleeding or anti-platelet use: Chronic anticoagulated with Coumadin. ASA 81 mg daily.    Menstrual history: No LMP for male patient.    Steroid use in the last year: Denies.     Personal or FH with difficulty with Anesthesia:  Denies.    Prior to Admission Medications  Current Outpatient Prescriptions   Medication Sig Dispense Refill     ofloxacin (FLOXIN) 0.3 % otic solution Place 5 drops into the right ear 2 times daily for 14 days 7 mL 1     amLODIPine-benazepril (LOTREL) 2.5-10 MG per capsule Take 1 capsule by mouth every evening        aspirin 81 MG chewable tablet Take 81 mg by mouth every evening        Cholecalciferol (VITAMIN D) 2000 UNITS tablet Take 2,000 Units by mouth every evening        fish oil-omega-3 fatty acids 1000 MG capsule Take 2 g by mouth every evening        warfarin (COUMADIN) 2 MG tablet Lab 4/12/17: Take 6 mg daily. This dose may change.       simvastatin (ZOCOR) 40 MG tablet TAKE ONE TABLET BY MOUTH IN EVENING       acetaminophen (TYLENOL) 500 MG tablet Take 500-1,000 mg by mouth every 4 hours as needed          Allergies  Allergies   Allergen Reactions     Albuterol Hives     Albuterol Sulfate NEBU     Penicillins Rash     reaction: Shortness of breath and facial hives - sudden onset, Verified in Meditech: Y, Severity in Meditech: S, Penicillins - Rash       Social History  Social History     Social History     Marital status:      Spouse name: N/A     Number of children: N/A     Years of education: N/A     Occupational History     Heavy machinery      Social History Main Topics     Smoking status: Current Every Day Smoker     Packs/day: 0.50     Years: 45.00     Types: Cigarettes     Smokeless tobacco: Not on file     Alcohol use Yes      Comment: 2 double shot drinks daily      Drug use: No     Sexual activity: Not on file     Other Topics Concern     Not on file     Social History Narrative       Family History  Family  "History   Problem Relation Age of Onset     Breast Cancer Mother      Colon Cancer Father      HEART DISEASE Sister      DIABETES Brother      ROS:  The complete review of systems is negative other than noted in the HPI or here.   Constitutional: Denies fever, chills, weight loss.  HEENT: Wears glasses for vision. Right ear occasional dry brown drainage. No hearing in right ear. Occasional pain in right ear.   Respiratory: Denies cough or shortness of breath. Denies concern for SHELLY.  CV: Denies chest pain or irregular HR. Good exercise tolerance.  GI: Denies abdominal pain or bowel issues.  : Denies dysuria.  M/S: Generalized joint pain. Herniated disk in back.  Neuro: Denies symptoms.    Temp: 98.1  F (36.7  C) Temp src: Oral BP: 135/78 Pulse: 70   Resp: 16 SpO2: 96 %         225 lbs 12.8 oz  5' 11\"   Body mass index is 31.49 kg/(m^2).       Physical Exam  Constitutional: Awake, alert, cooperative, no apparent distress, and appears stated age. Accompanied by wife.  Eyes: Pupils equal, round and reactive to light, extra ocular muscles intact, sclera clear, conjunctiva normal. Glasses on.  HENT: Normocephalic, oral pharynx with moist mucus membranes, some teeth missing. No goiter appreciated.   Respiratory: Clear to auscultation bilaterally, no crackles or wheezing. No cough or obvious dyspnea.  Cardiovascular: Regular rate and rhythm, normal S1 and S2, and no murmur noted. Carotids, no bruits. No edema. Palpable pulses to radial  DP and PT arteries.   GI: Normal bowel sounds, soft, non-distended, non-tender, no masses palpated. Difficult exam due to obese abdomen.  Lymph/Hematologic: No cervical lymphadenopathy and no supraclavicular lymphadenopathy.  Genitourinary:  Deferred.  Skin: Warm and dry.  No rashes at anticipated surgical site.   Musculoskeletal: Mildly limited neck extension. There is no redness, warmth, or swelling of the joints. Gross motor strength is normal.    Neurologic: Awake, alert, oriented to " name, place and time. Cranial nerves II-XII are grossly intact. Gait is normal.   Neuropsychiatric: Calm, cooperative. Normal affect.     Labs: (personally reviewed)  Lab Results   Component Value Date    WBC 5.5 2017     Lab Results   Component Value Date    RBC 4.44 2017     Lab Results   Component Value Date    HGB 15.3 2017     Lab Results   Component Value Date    HCT 42.2 2017     Lab Results   Component Value Date    MCV 95 2017     Lab Results   Component Value Date    MCH 34.5 2017     Lab Results   Component Value Date    MCHC 36.3 2017     Lab Results   Component Value Date    RDW 12.4 2017     Lab Results   Component Value Date     2017     Last Basic Metabolic Panel:  Lab Results   Component Value Date     2017      Lab Results   Component Value Date    POTASSIUM 4.5 2017     Lab Results   Component Value Date    CHLORIDE 108 2017     Lab Results   Component Value Date    ALAN 8.8 2017     Lab Results   Component Value Date    CO2 25 2017     Lab Results   Component Value Date    BUN 12 2017     Lab Results   Component Value Date    CR 0.80 2017     Lab Results   Component Value Date    GLC 91 2017     INR 2.70  EKG: Personally reviewed but formal cardiology read pendin17 Normal sinus rhythm    2017 CT temporal bones  IMPRESSION:  1. Cholesteatoma, granulation tissue or a combination thereof occupying the entire residual right middle ear cleft with erosion of the incus and stapes status post canal -down mastoidectomy on the right.  2. Minimal patchy fluid/ soft tissue density and some and mastoid air cells on the left.   3. Otherwise normal left temporal bone CT.  4. Status post endovascular coil embolization of cerebral aneurysms in the anterior communicating region and in the expected location of the origin of the left ophthalmic artery.  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  Will obtain 5 year follow-up MRA, due in March 2018.  Outside records reviewed from: Care Everywhere    ASSESSMENT and PLAN  Ed Cain is a 66 year old male scheduled to undergo right ear conversion to radical mastoidectomy with Dr. Isaacs on date TBD. He has the following specific operative considerations:   - RCRI : 0.9% risk of major adverse cardiac event.   - Anesthesia considerations:  Refer to PAC assessment in anesthesia records  - Risk of PONV score = 0.  If > 2, anti-emetic intervention recommended.     --Right side recurrent cholesteatoma. s/p multiple procedures over time. Above procedure now planned. Hearing loss right ear.   --HLD. Simvastatin. HTN. Will hold Lotrel on DOS. ASA 81 mg and will remain on until DOS. No other cardiac history or symptoms. Good activity tolerance. EKG NSR.   --Current smoker 1/2 PPD X 45 years. Denies pulmonary symptoms. No meds. Denies concern/symptoms for SHELLY. Able to lie flat.   --History of PE in 2011 after leg injury. Past history of multiple DVTs. Chronically anticoagulated with Coumadin. He is well followed by PCP, Dr. Snowden who will provided instructions for Coumadin hold and Lovenox bridge. Patient has done this several times in the past.    --History of ruptured cerebral aneurysm in 2011, s/p coiling X 2, need for  shunt, now removed and prolonged hospital stay and rehab. 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     --ETOH use. Two double shot drinks daily.    --Patient approved for ASC. Final scheduling details per ENT.   Arrival time, NPO, shower and medication instructions provided by nursing staff today. Preparing For Your Surgery handout given.  Patient was discussed with Dr Mcguire.    MAGUI sTe CNS  Preoperative Assessment Center  [facility]  Phone: [phone]  Fax: [phone]

## 2017-05-25 NOTE — PROGRESS NOTES
Preoperative Assessment Center medication history for May 25, 2017 is complete.    See Epic admission navigator for allergy information, pharmacy and prior to admission medications.    Operating room staff will still need to confirm medications and last dose information on day of surgery.     Medication history interview sources:  Patient Interview    Changes made to PTA medication list (reason)  Added: None    Deleted:   -- Cefadroxil - patient reports finishing abx a long time ago    Changed:   -- updated fish oil dose per pt    Additional medication history information (including reliability of information, actions taken by pharmacist):  -- No implantable devices  -- No recent (within 30 days) courses of antibiotics   -- Patient is on warfarin for DVT/PE ppx with a goal INR of 2-3. He is managed by [facility] and is currently taking warfarin 6mg by mouth daily       Prior to Admission medications    Medication Sig Last Dose Taking? Auth Provider   ofloxacin (FLOXIN) 0.3 % otic solution Place 5 drops into the right ear 2 times daily for 14 days Taking  Hanny Isaacs MD   acetaminophen (TYLENOL) 500 MG tablet Take 500-1,000 mg by mouth every 4 hours as needed  Not Taking  Reported, Patient   amLODIPine-benazepril (LOTREL) 2.5-10 MG per capsule Take 1 capsule by mouth daily  Taking  Reported, Patient   aspirin 81 MG chewable tablet Take 81 mg by mouth every evening  Taking  Reported, Patient   Cholecalciferol (VITAMIN D) 2000 UNITS tablet Take 2,000 Units by mouth every evening  Taking  Reported, Patient   fish oil-omega-3 fatty acids 1000 MG capsule Take 2 g by mouth every evening  Taking  Reported, Patient   warfarin (COUMADIN) 2 MG tablet Lab 4/12/17: Take 6 mg daily. This dose may change. Taking  Reported, Patient   simvastatin (ZOCOR) 40 MG tablet TAKE ONE TABLET BY MOUTH IN EVENING Taking  Reported, Patient         Medication history completed by:   Remy Bolivar, Pharm.D, BCPS

## 2017-05-25 NOTE — MR AVS SNAPSHOT
After Visit Summary   2017    Ed Cain    MRN: [id number]           Patient Information     Date Of Birth          1950        Visit Information        Provider Department      2017 9:00 AM Rn, [facility]        Care Instructions    Preparing for Your Surgery      Name:  Ed Cain   MRN:  [id number]   :  1950   Today's Date:  2017     Arriving for surgery:  Surgery date:    Surgery time:  Surgery not yet scheduled--An RN from the Pre-Admission Office will contact you 1-2 days before your procedure to confirm arrival time, location and fasting insturctions   Arrival time:    Please come to:        To Be Determined       What can I eat or drink?  -  You may have solid food or milk products until 8 hours prior to your surgery.  -  You may have water, apple juice or 7up/Sprite until 2 hours prior to your surgery.    Which medicines can I take?  -  Do NOT take these medications in the morning, the day of surgery:       Coumadin to be managed with primary physician        Hold Lotrel the day of surgery        Please hold Viagra ( sidenafil) for 24 hours before surgery        Please hold aspirin the day of surgery       -  Please take these medications the day of surgery:                  Cefadroxil (Duracef )      How do I prepare myself?  -  Take two showers: one the night before surgery; and one the morning of surgery.         Use Scrubcare or Hibiclens to wash from neck down.  You may use your own shampoo and conditioner. No other hair products.   -  Do NOT use lotion, powder, deodorant, or antiperspirant the day of your surgery.  -  Do NOT wear any makeup, fingernail polish or jewelry.  -Do not bring your own medications to the hospital, except for inhalers and eye drops.  -  Bring your ID and insurance card.    Questions or Concerns:  If you have questions or concerns, please call the  Preoperative Assessment Center,  Monday-Friday 7AM-7PM:  [phone]                    Follow-ups after your visit        Your next 10 appointments already scheduled     May 25, 2017  8:30 AM CDT   (Arrive by 8:15 AM)   PAC Pharmacist with  Pac Pharmacist   [facility] ([facility])    [address] [place] [address]  4th [facility] [address]   [phone]            May 25, 2017  9:00 AM CDT   (Arrive by 8:45 AM)   PAC RN ASSESSMENT with Yash Pac Rn   [facility] Preoperative Assessment [place] ([facility])    [address] [place] [address]   [phone]            May 25, 2017  9:30 AM CDT   (Arrive by 9:15 AM)   PAC Anesthesia Consult with Yash Pac Anesthesiologist   [facility]operative Assessment [place] ([facility])    [address] [place] [address]   [phone]            May 25, 2017 10:45 AM CDT   LAB with YASH LAB   [facility] ([facility])    [address] [place] [address]   [phone]           Patient must bring picture ID.  Patient should be prepared to give a urine specimen  Please do not eat 10-12 hours before your appointment if you are coming in fasting for labs on lipids, cholesterol, or glucose (sugar).  Pregnant women should follow their Care Team instructions. Water with medications is okay. Do not drink coffee or other fluids.   If you have concerns about taking  your medications, please ask at office or if scheduling via [facility], send a message by clicking on Secure Messaging, Message Your Care Team.            May 25, 2017 12:00 PM CDT   NM INJECT 3PH BONE SCAN with [address], Nuclear Medicine ([facility])    [address] [facility] [address]   [phone]            May 25, 2017  3:00 PM CDT   NM BONE SCAN 3 PHASE with [address] [place],  Nuclear Medicine ([facility])    [address] [facility] [id number] [phone]           Please bring a list of your medicines to the exam. (Include vitamins, minerals and over-the-counter drugs.) You should wear comfortable clothes. Leave your valuables at home. Please bring related prior results and films. Tell your doctor:   If you are breastfeeding or may be pregnant.   If you have had a barium test within the past few days. Barium may change the results of certain exams.   If you think you may need sedation (medicine to help you relax).  You may eat and drink as normal.  Drink plenty of fluids and empty bladder frequently between injection and scans.              Who to contact     Please call your clinic at [phone] to:    Ask questions about your health    Make or cancel appointments    Discuss your medicines    Learn about your test results    Speak to your doctor   If you have compliments or concerns about an experience at your clinic, or if you wish to file a complaint, please contact [facility] Patient Relations at [phone] or email us at [email or web address]         Additional Information About Your Visit        [employer] Information     [employer] is an electronic gateway that provides easy, online access to your medical records. With [employer], you can request a clinic appointment, read your test results, renew a prescription or communicate with your care team.     To sign up for [employer] visit the website at [email or web address]   You will be asked to enter the access code listed below, as well as some personal information. Please follow the directions to create your username and password.     Your access code is: [email or web address]  Expires: 2017  8:58 AM     Your access code will  in 90 days. If you need help or a new code, please contact your [facility] or  call [phone] for assistance.        Care EveryWhere ID     This is your Care EveryWhere ID. This could be used by other organizations to access your [place] medical records  [id number]         Blood Pressure from Last 3 Encounters:   05/25/17 135/78    Weight from Last 3 Encounters:   05/25/17 102.4 kg (225 lb 12.8 oz)   05/18/17 101.6 kg (224 lb)              Today, you had the following     No orders found for display       Primary Care Provider Office Phone # Fax #    Alfred Snowden [phone]       [facility] [address]        Thank you!     Thank you for choosing [facility] PREOPERATIVE [facility]ESSMENT [facility]ENTER  for your care. Our goal is always to provide you with excellent care. Hearing back from our patients is one way we can continue to improve our services. Please take a few minutes to complete the written survey that you may receive in the mail after your visit with us. Thank you!             Your Updated Medication List - Protect others around you: Learn how to safely use, store and throw away your medicines at www.disposemymeds.org.          This list is accurate as of: 5/25/17  8:22 AM.  Always use your most recent med list.                   Brand Name Dispense Instructions for use    acetaminophen 500 MG tablet    TYLENOL     Take 500-1,000 mg by mouth every 4 hours as needed       amLODIPine-benazepril 2.5-10 MG per capsule    LOTREL     Take 1 capsule by mouth daily       aspirin 81 MG chewable tablet      Take 81 mg by mouth every evening       fish oil-omega-3 fatty acids 1000 MG capsule      Take 2 g by mouth every evening       ofloxacin 0.3 % otic solution    FLOXIN    7 mL    Place 5 drops into the right ear 2 times daily for 14 days       simvastatin 40 MG tablet    ZOCOR     TAKE ONE TABLET BY MOUTH IN EVENING       vitamin D 2000 UNITS tablet      Take 2,000 Units by mouth every evening       warfarin 2 MG tablet    COUMADIN     Lab  4/12/17: Take 6 mg daily. This dose may change.

## 2017-05-25 NOTE — PATIENT INSTRUCTIONS
Preparing for Your Surgery      Name:  Ed Cain   MRN:  [id number]   :  1950   Today's Date:  2017     Arriving for surgery:  Surgery date:    Surgery time:  Surgery not yet scheduled--An RN from the Pre-Admission Office will contact you 1-2 days before your procedure to confirm arrival time, location and fasting insturctions   Arrival time:    Please come to:        To Be Determined       What can I eat or drink?  -  You may have solid food or milk products until 8 hours prior to your surgery.  -  You may have water, apple juice or 7up/Sprite until 2 hours prior to your surgery.    Which medicines can I take?  -  Do NOT take these medications in the morning, the day of surgery:       Coumadin to be managed with primary physician        Hold Lotrel the day of surgery        Please hold Viagra ( sidenafil) for 24 hours before surgery        Please hold aspirin the day of surgery       -  Please take these medications the day of surgery:                  Cefadroxil (Duracef )      How do I prepare myself?  -  Take two showers: one the night before surgery; and one the morning of surgery.         Use Scrubcare or Hibiclens to wash from neck down.  You may use your own shampoo and conditioner. No other hair products.   -  Do NOT use lotion, powder, deodorant, or antiperspirant the day of your surgery.  -  Do NOT wear any makeup, fingernail polish or jewelry.  -Do not bring your own medications to the hospital, except for inhalers and eye drops.  -  Bring your ID and insurance card.    Questions or Concerns:  If you have questions or concerns, please call the  Preoperative Assessment Center, Monday-Friday 7AM-7PM:  [phone]

## 2017-05-25 NOTE — MR AVS SNAPSHOT
After Visit Summary   5/25/2017    Ed Cain    MRN: [id number]           Patient Information     Date Of Birth          1950        Visit Information        Provider Department      5/25/2017 8:30 AM Pharmacist, Yash [facility] [place]        Today's Diagnoses     Preop examination    -  1       Follow-ups after your visit        Your next 10 appointments already scheduled     May 25, 2017  8:00 AM CDT   (Arrive by 7:45 AM)   PAC EVALUATION with Yash Pac Negar 1   [facility] [place] ([facility])    [address] [place] [address]   [phone]            May 25, 2017  8:30 AM CDT   (Arrive by 8:15 AM)   PAC Pharmacist with Yash Pac Pharmacist   [facility] [place] ([facility])    [address] [place] [address]   [phone]            May 25, 2017  9:00 AM CDT   (Arrive by 8:45 AM)   PAC RN ASSESSMENT with Yash Pac Rn   [facility] [place] ([facility])    [address] [place] [address]   [phone]            May 25, 2017  9:30 AM CDT   (Arrive by 9:15 AM)   PAC Anesthesia Consult with Yash Pac Anesthesiologist   [facility] [place] ([facility])    [address] [place] [address]   [phone]            May 25, 2017 10:45 AM CDT   LAB with YASH LAB   [facility])    [address] [place] [address]   [phone]           Patient must bring picture ID.  Patient should be prepared to give a urine specimen  Please do not eat 10-12 hours before your appointment if you are coming in fasting for labs on lipids, cholesterol, or glucose (sugar).  Pregnant women should follow their Care Team instructions. Water with medications is  okay. Do not drink coffee or other fluids.   If you have concerns about taking  your medications, please ask at office or if scheduling via [employer], send a message by clicking on Secure Messaging, Message Your Care Team.            May 25, 2017 12:00 PM CDT   NM INJECT 3PH BONE SCAN with [address], Nuclear Medicine ([facility])    [address] [facility] [address] [phone]            May 25, 2017  3:00 PM CDT   NM BONE SCAN 3 PHASE with [address], Nuclear Medicine ([facility])    [address] [facility] [address] [phone]           Please bring a list of your medicines to the exam. (Include vitamins, minerals and over-the-counter drugs.) You should wear comfortable clothes. Leave your valuables at home. Please bring related prior results and films. Tell your doctor:   If you are breastfeeding or may be pregnant.   If you have had a barium test within the past few days. Barium may change the results of certain exams.   If you think you may need sedation (medicine to help you relax).  You may eat and drink as normal.  Drink plenty of fluids and empty bladder frequently between injection and scans.              Who to contact     Please call your clinic at [phone] to:    Ask questions about your health    Make or cancel appointments    Discuss your medicines    Learn about your test results    Speak to your doctor   If you have compliments or concerns about an experience at your clinic, or if you wish to file a complaint, please contact [facility] Patient Relations at [phone] or email us at [email or web address]         Additional Information About Your Visit        [employer] Information     [employer] is an electronic gateway that provides easy, online access to your medical records. With [employer], you can request a clinic  appointment, read your test results, renew a prescription or communicate with your care team.     To sign up for [employer] visit the website at [email or web address]   You will be asked to enter the access code listed below, as well as some personal information. Please follow the directions to create your username and password.     Your access code is: [email or web address]  Expires: 2017  8:58 AM     Your access code will  in 90 days. If you need help or a new code, please contact your [facility] or call [phone] for assistance.        Care EveryWhere ID     This is your Care EveryWhere ID. This could be used by other organizations to access your [place] medical records  [id number]         Blood Pressure from Last 3 Encounters:   17 135/78    Weight from Last 3 Encounters:   17 102.4 kg (225 lb 12.8 oz)   17 101.6 kg (224 lb)              Today, you had the following     No orders found for display       Primary Care Provider Office Phone # Fax #    Alfred Snowden [phone]       [address] [address]        Thank you!     Thank you for choosing [facility] PREOPERATIVE ASSESSMENT [place]  for your care. Our goal is always to provide you with excellent care. Hearing back from our patients is one way we can continue to improve our services. Please take a few minutes to complete the written survey that you may receive in the mail after your visit with us. Thank you!             Your Updated Medication List - Protect others around you: Learn how to safely use, store and throw away your medicines at www.disposemymeds.org.          This list is accurate as of: 17  7:59 AM.  Always use your most recent med list.                   Brand Name Dispense Instructions for use    acetaminophen 500 MG tablet    TYLENOL     Take 500-1,000 mg by mouth every 4 hours as needed       amLODIPine-benazepril 2.5-10 MG per  capsule    LOTREL     Take 1 capsule by mouth daily       aspirin 81 MG chewable tablet      Take 81 mg by mouth every evening       fish oil-omega-3 fatty acids 1000 MG capsule      Take 2 g by mouth every evening       ofloxacin 0.3 % otic solution    FLOXIN    7 mL    Place 5 drops into the right ear 2 times daily for 14 days       simvastatin 40 MG tablet    ZOCOR     TAKE ONE TABLET BY MOUTH IN EVENING       vitamin D 2000 UNITS tablet      Take 2,000 Units by mouth every evening       warfarin 2 MG tablet    COUMADIN     Lab 4/12/17: Take 6 mg daily. This dose may change.

## 2017-05-26 LAB — INTERPRETATION ECG - MUSE: NORMAL

## 2017-05-31 ENCOUNTER — CARE COORDINATION (OUTPATIENT)
Dept: OTOLARYNGOLOGY | Facility: CLINIC | Age: 67
End: 2017-05-31

## 2017-05-31 NOTE — PROGRESS NOTES
Ed Cain   MEDICA  MRN:       Message  Received: Yesterday       Hanny Isaacs MD Bergh, SHELLIE Guevara, bone scan negative so no need for multiple weeks of antibiotics.  Proceed with surgery as scheduled.  Thanks.   5-31-17 above message left on pt voice mail-call for any questions.-- Dee Sinha RN  ENT Care Coordinator   Otology  [phone]  Hanny

## 2017-06-02 NOTE — PHARMACY - PREOPERATIVE ASSESSMENT CENTER
ANTICOAGULATION DOCUMENTATION - Preoperative Assessment Center (PAC) Pharmacist   Patient seen and interviewed during time of PAC Clinic appointment June 2, 2017.     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 It is prescribed by Dr. Lam Snowden.  The expected duration of therapy is indefinite.    Current medications that may interact with this include aspirin (inc bleed).  There has not been a recent change in oral intake/nutrition.      Ed Cain is scheduled for surgery on 6/20/17 and the perioperative anticoagulation plan outlined by PCP Dr. Snowden is to hold coumadin 5 days prior to the procedure (last dose on 6/14/17). While off couamdin he will be bridged with lovenox 80mg SC every 12 hours starting the morning of 6/16/17 with last dose being on 6/19/17. Resumption of warfarin will be upon the surgeon base on bleed risks and complications from the procedure. This plan may require re-assessment and modification by his primary team in the perioperative setting depending on patients clinical situation.        Remy Bolivar RPH  June 2, 2017  11:14 AM

## 2017-06-15 LAB
FUNGUS SPEC CULT: NORMAL
MICRO REPORT STATUS: NORMAL
SPECIMEN SOURCE: NORMAL

## 2017-06-16 ENCOUNTER — ALLIED HEALTH/NURSE VISIT (OUTPATIENT)
Dept: SURGERY | Facility: CLINIC | Age: 67
End: 2017-06-16

## 2017-06-19 ENCOUNTER — ANESTHESIA EVENT (OUTPATIENT)
Dept: SURGERY | Facility: AMBULATORY SURGERY CENTER | Age: 67
End: 2017-06-19

## 2017-06-20 ENCOUNTER — SURGERY (OUTPATIENT)
Age: 67
End: 2017-06-20

## 2017-06-20 ENCOUNTER — ANESTHESIA (OUTPATIENT)
Dept: SURGERY | Facility: AMBULATORY SURGERY CENTER | Age: 67
End: 2017-06-20

## 2017-06-20 ENCOUNTER — HOSPITAL ENCOUNTER (OUTPATIENT)
Facility: AMBULATORY SURGERY CENTER | Age: 67
End: 2017-06-20
Attending: OTOLARYNGOLOGY

## 2017-06-20 VITALS
TEMPERATURE: 97.9 F | OXYGEN SATURATION: 93 % | WEIGHT: 225.8 LBS | HEIGHT: 71 IN | RESPIRATION RATE: 16 BRPM | DIASTOLIC BLOOD PRESSURE: 65 MMHG | BODY MASS INDEX: 31.61 KG/M2 | SYSTOLIC BLOOD PRESSURE: 107 MMHG

## 2017-06-20 DIAGNOSIS — H71.91 CHOLESTEATOMA, RIGHT: ICD-10-CM

## 2017-06-20 DIAGNOSIS — G89.18 POSTOPERATIVE PAIN: Primary | ICD-10-CM

## 2017-06-20 RX ORDER — DEXAMETHASONE SODIUM PHOSPHATE 10 MG/ML
10 INJECTION, SOLUTION INTRAMUSCULAR; INTRAVENOUS ONCE
Status: DISCONTINUED | OUTPATIENT
Start: 2017-06-20 | End: 2017-06-21 | Stop reason: HOSPADM

## 2017-06-20 RX ORDER — SODIUM CHLORIDE, SODIUM LACTATE, POTASSIUM CHLORIDE, CALCIUM CHLORIDE 600; 310; 30; 20 MG/100ML; MG/100ML; MG/100ML; MG/100ML
INJECTION, SOLUTION INTRAVENOUS CONTINUOUS
Status: DISCONTINUED | OUTPATIENT
Start: 2017-06-20 | End: 2017-06-21 | Stop reason: HOSPADM

## 2017-06-20 RX ORDER — DEXAMETHASONE SODIUM PHOSPHATE 4 MG/ML
INJECTION, SOLUTION INTRA-ARTICULAR; INTRALESIONAL; INTRAMUSCULAR; INTRAVENOUS; SOFT TISSUE PRN
Status: DISCONTINUED | OUTPATIENT
Start: 2017-06-20 | End: 2017-06-20

## 2017-06-20 RX ORDER — OFLOXACIN 3 MG/ML
10 SOLUTION AURICULAR (OTIC) 2 TIMES DAILY
Qty: 20 ML | Refills: 0 | Status: SHIPPED | OUTPATIENT
Start: 2017-06-27 | End: 2017-07-11

## 2017-06-20 RX ORDER — PROPOFOL 10 MG/ML
INJECTION, EMULSION INTRAVENOUS CONTINUOUS PRN
Status: DISCONTINUED | OUTPATIENT
Start: 2017-06-20 | End: 2017-06-20

## 2017-06-20 RX ORDER — LIDOCAINE HYDROCHLORIDE 10 MG/ML
INJECTION, SOLUTION INFILTRATION; PERINEURAL PRN
Status: DISCONTINUED | OUTPATIENT
Start: 2017-06-20 | End: 2017-06-20

## 2017-06-20 RX ORDER — GABAPENTIN 300 MG/1
300 CAPSULE ORAL ONCE
Status: COMPLETED | OUTPATIENT
Start: 2017-06-20 | End: 2017-06-20

## 2017-06-20 RX ORDER — CLINDAMYCIN PHOSPHATE 900 MG/50ML
900 INJECTION, SOLUTION INTRAVENOUS
Status: COMPLETED | OUTPATIENT
Start: 2017-06-20 | End: 2017-06-20

## 2017-06-20 RX ORDER — NALOXONE HYDROCHLORIDE 0.4 MG/ML
.1-.4 INJECTION, SOLUTION INTRAMUSCULAR; INTRAVENOUS; SUBCUTANEOUS
Status: DISCONTINUED | OUTPATIENT
Start: 2017-06-20 | End: 2017-06-21 | Stop reason: HOSPADM

## 2017-06-20 RX ORDER — ONDANSETRON 2 MG/ML
4 INJECTION INTRAMUSCULAR; INTRAVENOUS EVERY 30 MIN PRN
Status: DISCONTINUED | OUTPATIENT
Start: 2017-06-20 | End: 2017-06-21 | Stop reason: HOSPADM

## 2017-06-20 RX ORDER — FENTANYL CITRATE 50 UG/ML
INJECTION, SOLUTION INTRAMUSCULAR; INTRAVENOUS PRN
Status: DISCONTINUED | OUTPATIENT
Start: 2017-06-20 | End: 2017-06-20

## 2017-06-20 RX ORDER — LIDOCAINE 40 MG/G
CREAM TOPICAL
Status: DISCONTINUED | OUTPATIENT
Start: 2017-06-20 | End: 2017-06-21 | Stop reason: HOSPADM

## 2017-06-20 RX ORDER — CLINDAMYCIN PHOSPHATE 900 MG/50ML
900 INJECTION, SOLUTION INTRAVENOUS SEE ADMIN INSTRUCTIONS
Status: DISCONTINUED | OUTPATIENT
Start: 2017-06-20 | End: 2017-06-21 | Stop reason: HOSPADM

## 2017-06-20 RX ORDER — FENTANYL CITRATE 50 UG/ML
25-50 INJECTION, SOLUTION INTRAMUSCULAR; INTRAVENOUS
Status: DISCONTINUED | OUTPATIENT
Start: 2017-06-20 | End: 2017-06-21 | Stop reason: HOSPADM

## 2017-06-20 RX ORDER — HYDROCODONE BITARTRATE AND ACETAMINOPHEN 5; 325 MG/1; MG/1
1 TABLET ORAL EVERY 4 HOURS PRN
Qty: 20 TABLET | Refills: 0 | Status: SHIPPED | OUTPATIENT
Start: 2017-06-20 | End: 2017-09-20

## 2017-06-20 RX ORDER — ACETAMINOPHEN 325 MG/1
975 TABLET ORAL ONCE
Status: COMPLETED | OUTPATIENT
Start: 2017-06-20 | End: 2017-06-20

## 2017-06-20 RX ORDER — ONDANSETRON 4 MG/1
4 TABLET, ORALLY DISINTEGRATING ORAL EVERY 30 MIN PRN
Status: DISCONTINUED | OUTPATIENT
Start: 2017-06-20 | End: 2017-06-21 | Stop reason: HOSPADM

## 2017-06-20 RX ORDER — EPHEDRINE SULFATE 50 MG/ML
INJECTION, SOLUTION INTRAMUSCULAR; INTRAVENOUS; SUBCUTANEOUS PRN
Status: DISCONTINUED | OUTPATIENT
Start: 2017-06-20 | End: 2017-06-20

## 2017-06-20 RX ORDER — EPINEPHRINE NASAL SOLUTION 1 MG/ML
SOLUTION NASAL PRN
Status: DISCONTINUED | OUTPATIENT
Start: 2017-06-20 | End: 2017-06-20 | Stop reason: HOSPADM

## 2017-06-20 RX ORDER — PROPOFOL 10 MG/ML
INJECTION, EMULSION INTRAVENOUS PRN
Status: DISCONTINUED | OUTPATIENT
Start: 2017-06-20 | End: 2017-06-20

## 2017-06-20 RX ADMIN — DEXAMETHASONE SODIUM PHOSPHATE 10 MG: 4 INJECTION, SOLUTION INTRA-ARTICULAR; INTRALESIONAL; INTRAMUSCULAR; INTRAVENOUS; SOFT TISSUE at 12:50

## 2017-06-20 RX ADMIN — PROPOFOL 50 MG: 10 INJECTION, EMULSION INTRAVENOUS at 14:20

## 2017-06-20 RX ADMIN — Medication 200 MCG: at 13:21

## 2017-06-20 RX ADMIN — GABAPENTIN 300 MG: 300 CAPSULE ORAL at 12:05

## 2017-06-20 RX ADMIN — Medication 200 MCG: at 13:17

## 2017-06-20 RX ADMIN — PROPOFOL: 10 INJECTION, EMULSION INTRAVENOUS at 13:45

## 2017-06-20 RX ADMIN — EPHEDRINE SULFATE 5 MG: 50 INJECTION, SOLUTION INTRAMUSCULAR; INTRAVENOUS; SUBCUTANEOUS at 14:05

## 2017-06-20 RX ADMIN — PROPOFOL: 10 INJECTION, EMULSION INTRAVENOUS at 13:15

## 2017-06-20 RX ADMIN — Medication 200 MCG: at 13:38

## 2017-06-20 RX ADMIN — FENTANYL CITRATE 100 MCG: 50 INJECTION, SOLUTION INTRAMUSCULAR; INTRAVENOUS at 12:50

## 2017-06-20 RX ADMIN — PROPOFOL: 10 INJECTION, EMULSION INTRAVENOUS at 14:00

## 2017-06-20 RX ADMIN — EPHEDRINE SULFATE 10 MG: 50 INJECTION, SOLUTION INTRAMUSCULAR; INTRAVENOUS; SUBCUTANEOUS at 13:43

## 2017-06-20 RX ADMIN — Medication 0.2 MG: at 14:20

## 2017-06-20 RX ADMIN — SODIUM CHLORIDE, SODIUM LACTATE, POTASSIUM CHLORIDE, CALCIUM CHLORIDE: 600; 310; 30; 20 INJECTION, SOLUTION INTRAVENOUS at 14:30

## 2017-06-20 RX ADMIN — Medication 0.2 MG: at 13:56

## 2017-06-20 RX ADMIN — CLINDAMYCIN PHOSPHATE 900 MG: 900 INJECTION, SOLUTION INTRAVENOUS at 12:50

## 2017-06-20 RX ADMIN — EPINEPHRINE NASAL SOLUTION 30 ML: 1 SOLUTION NASAL at 13:35

## 2017-06-20 RX ADMIN — Medication 200 MCG: at 13:31

## 2017-06-20 RX ADMIN — LIDOCAINE HYDROCHLORIDE 100 MG: 10 INJECTION, SOLUTION INFILTRATION; PERINEURAL at 12:50

## 2017-06-20 RX ADMIN — SODIUM CHLORIDE, SODIUM LACTATE, POTASSIUM CHLORIDE, CALCIUM CHLORIDE: 600; 310; 30; 20 INJECTION, SOLUTION INTRAVENOUS at 12:05

## 2017-06-20 RX ADMIN — PROPOFOL 150 MG: 10 INJECTION, EMULSION INTRAVENOUS at 12:50

## 2017-06-20 RX ADMIN — ACETAMINOPHEN 975 MG: 325 TABLET ORAL at 12:05

## 2017-06-20 RX ADMIN — PROPOFOL 75 MG: 10 INJECTION, EMULSION INTRAVENOUS at 13:45

## 2017-06-20 RX ADMIN — Medication 200 MCG: at 13:25

## 2017-06-20 RX ADMIN — PROPOFOL 200 MCG/KG/MIN: 10 INJECTION, EMULSION INTRAVENOUS at 12:50

## 2017-06-20 RX ADMIN — PROPOFOL 50 MG: 10 INJECTION, EMULSION INTRAVENOUS at 12:52

## 2017-06-20 RX ADMIN — EPHEDRINE SULFATE 5 MG: 50 INJECTION, SOLUTION INTRAMUSCULAR; INTRAVENOUS; SUBCUTANEOUS at 13:49

## 2017-06-20 ASSESSMENT — LIFESTYLE VARIABLES: TOBACCO_USE: 1

## 2017-06-20 NOTE — BRIEF OP NOTE
[facility]    Brief Operative Note    Pre-operative diagnosis: Cholesteatoma  Post-operative diagnosis Same  Procedure: Procedure(s):  Conversion to Radical Mastoidectomy-Right Ear - Wound Class: II-Clean Contaminated  Surgeon: Surgeon(s) and Role:     * Hanny Isaacs MD - Primary       Saroj Valentin MD - Assisting  Anesthesia: General   Estimated blood loss: Less than 5 cc  Drains: None  Specimens:   ID Type Source Tests Collected by Time Destination   A : Right Middle Ear Contents Tissue Ear, Middle, Right SURGICAL PATHOLOGY EXAM Hanny Isaacs MD 6/20/2017  2:17 PM      Findings:   Extrusion of TORP through tympanic membrane. Cholesteatoma involving the meso and epitympanum, extending towards infratemporal fossa and into eustachian tube. CWD mastoidectomy converted to radical mastoidectomy.   Complications: None.  Implants: None.

## 2017-06-20 NOTE — ANESTHESIA PREPROCEDURE EVALUATION
Anesthesia Evaluation     . Pt has had prior anesthetic. Type: General    No history of anesthetic complications          ROS/MED HX    ENT/Pulmonary:     (+)SHELLY risk factors snores loudly, hypertension, obese, tobacco use, Current use 1/2 packs/day  , . .    Neurologic:     (+)CVA date: 2012 with deficits- mild central tremor, other neuro History of ruptured aneurysm, s/p prolonged hospital stay, aneurysm coiling X2,  shunt, now removed    Cardiovascular:     (+) Dyslipidemia, hypertension----. Taking blood thinners Pt has received instructions: Instructions Given to patient: Will remain on ASA. Local doctor to provide Lovenox bridge. . . :. . Previous cardiac testing date:results:date: results:ECG reviewed date:5/25/17 results:NSR date: results:          METS/Exercise Tolerance:  >4 METS   Hematologic:     (+) History of blood clots pt is anticoagulated, -     (-) History of Transfusion   Musculoskeletal:   (+) arthritis, , , other musculoskeletal- herniated disk      GI/Hepatic:     (+) GERD Other,       Renal/Genitourinary:  - ROS Renal section negative       Endo:     (+) Obesity, .      Psychiatric:  - neg psychiatric ROS       Infectious Disease:  - neg infectious disease ROS       Malignancy:      - no malignancy   Other:    (+) No chance of pregnancy C-spine cleared: N/A, no H/O Chronic Pain,no other significant disability                    Physical Exam      Airway   Mallampati: III  TM distance: >3 FB  Neck ROM: limited    Dental   (+) missing    Cardiovascular   Rhythm and rate: regular and normal      Pulmonary    breath sounds clear to auscultation    Other findings: For further details of assessment, testing, and physical exam please see H and P completed on same date.             PAC Discussion and Assessment    ASA Classification: 3  Case is suitable for: ASC  Anesthetic techniques and relevant risks discussed: GA  Invasive monitoring and risk discussed: No  Types:   Possibility and Risk of  blood transfusion discussed: No  NPO instructions given:   Additional anesthetic preparation and risks discussed:   Needs early admission to pre-op area:   Other:     PAC Resident/NP Anesthesia Assessment:  Ed Cain is a 66 year old male scheduled to undergo right ear conversion to radical mastoidectomy with Dr. Isaacs on date TBD. He has the following specific operative considerations:   - RCRI : 0.9% risk of major adverse cardiac event.   - Risk of PONV score = 0.  If > 2, anti-emetic intervention recommended.                --Right side recurrent cholesteatoma. s/p multiple procedures over time. Above procedure now planned. Hearing loss right ear.   --HLD. Simvastatin. HTN. Will hold Lotrel on DOS. ASA 81 mg and will remain on until DOS. No other cardiac history or symptoms. Good activity tolerance. EKG NSR.   --Current smoker 1/2 PPD X 45 years. Denies pulmonary symptoms. No meds. Denies concern/symptoms for SHELLY. Able to lie flat.   --History of PE in 2011 after leg injury. Past history of multiple DVTs. Chronically anticoagulated with Coumadin. He is well followed by PCP, Dr. Snowden who will provided instructions for Coumadin hold and Lovenox bridge. Patient has done this several times in the past.    --History of ruptured cerebral aneurysm in 2011, s/p coiling X 2, need for  shunt, now removed and prolonged hospital stay and rehab. 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     --ETOH use. Two double shot drinks daily.    --Patient approved for ASC. Final scheduling details per ENT.     Patient was discussed with Dr Mcguire.      Reviewed and Signed by PAC Mid-Level Provider/Resident  Mid-Level Provider/Resident: MAGUI Uriarte, CNS  Date: 5/25/17  Time: 7:45am    Attending Anesthesiologist Anesthesia Assessment:  I have examined the patient and reviewed the chart.  I have discussed the patient with the AMADOR and concur with her assessment.  The patient is very active with an activity level > 4 METS  and no cardiac symptoms.  He has no known cardiac disease and his EKG is normal.  He does smoke but has no known COPD, SHELLY or RADz, uses no MDIs, and has no limitations on his activity.  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  This was embolized in 2012 and again in 2013.  He currently has no neurologic symptoms. (He had ventriculostomy during hospitalization but no  shunt).  He had a PE following development of DVT after leg injury.  He is on chronic warfarin therapy.  His PCP is managing withholding warfarin and bridging with LMWH  In anticipation of surgery.    PE:  MPC 3, decreased extension, 4 FBTMD.  Temporary crown left maxillary molar (but his is to be replaced this week).  Lungs clear.  CVS  RRR with no murmur    No further testing is necessary at this time beyond INR.    Patient is suitable for GA in ASC.    Final plan per anesthesiologist the day of surgery.    Reviewed and Signed by PAC Anesthesiologist  Anesthesiologist: Alonzo Mcguire MD  Date: 05/25/2017  Time:   Pass/Fail:   Disposition:     PAC Pharmacist Assessment:        Pharmacist:   Date:   Time:      Anesthesia Plan      History & Physical Review  History and physical reviewed and following examination; no interval change.    ASA Status:  3 .    NPO Status:  > 8 hours    Plan for General and ETT with Intravenous induction. Maintenance will be TIVA.    PONV prophylaxis:  Ondansetron (or other 5HT-3) and Dexamethasone or Solumedrol       Postoperative Care  Postoperative pain management:  Multi-modal analgesia and Oral pain medications.      Consents  Anesthetic plan, risks, benefits and alternatives discussed with:  Patient..                          .

## 2017-06-20 NOTE — IP AVS SNAPSHOT
[facility]    [address] [place] [address]    Phone:  [phone]    Fax:  [phone]                                       After Visit Summary   6/20/2017    Ed Cain    MRN: [id number]           After Visit Summary Signature Page     I have received my discharge instructions, and my questions have been answered. I have discussed any challenges I see with this plan with the nurse or doctor.    ..........................................................................................................................................  Patient/Patient Representative Signature      ..........................................................................................................................................  Patient Representative Print Name and Relationship to Patient    ..................................................               ................................................  Date                                            Time    ..........................................................................................................................................  Reviewed by Signature/Title    ...................................................              ..............................................  Date                                                            Time

## 2017-06-20 NOTE — DISCHARGE INSTRUCTIONS
"1. Resume your home medications. Take pain medications as indicated. Use docusate to avoid constipation. Start your ear drops after the meatal plug is removed and use until your follow up with Dr. Isaacs.    2. Wound care  * Change the cotton ball in your ear as needed for drainage.  It's normal to expect some drainage from your ear for the first few days after surgery.    3. Use a cotton ball coated with vasoline to keep water out of ear canal.    4. For the next week, no heavy lifting more than 5 pounds, no strenuous activities. No nose blowing. Sneeze with your mouth open.    5. Please call MD or come to the ED for shortness of breath, trouble breathing, inability to tolerate liquids, intractable dizziness, or signs of infection such as fevers or redness.      Call the [phone] clinic number if you have any questions and concerns during the day and call [phone] at night and ask for \"ENT resident on call\".     6. Follow up with Dr. Isaacs as previously scheduled in 3 weeks.       [facility]  Home Care Following Anesthesia  For 24 hours after surgery:  1. Get plenty of rest.  A responsible adult must stay with you for at least 24 hours after you leave the surgery center.  2. Do not drive or use heavy equipment.  If you have weakness or tingling, don't drive or use heavy equipment until this feeling goes away.   3. Do not drink alcohol.   4. Avoid strenuous or risky activities.  Ask for help when climbing stairs.  5. You may feel lightheaded.  IF so, sit for a few minutes before standing.  Have someone help you get up.   6. If you have nausea (feel sick to your stomach): Drink only clear liquids such as apple juice, ginger ale, broth or 7-Up.  Rest may also help.  Be sure to drink enough fluids.  Move to a regular diet as you feel able.   7. You may have a slight fever.  Call the doctor if your fever is over 100 F (37.7 C) (taken under the tongue) or lasts longer than 24 " hours.  8. You may have a dry mouth, a sore throat, muscle aches or trouble sleeping. These should go away after 24 hours.  9. Do not make important or legal decisions.               Tips for taking pain medications  To get the best pain relief possible, remember these points:    Take pain medications as directed, before pain becomes severe.    Pain medication can upset your stomach: taking it with food may help.    Constipation is a common side effect of pain medication. Drink plenty of  fluids.    Eat foods high in fiber. Take a stool softener if recommended by your doctor or pharmacist.    Do not drink alcohol, drive or operate machinery while taking pain medications.    Ask about other ways to control pain, such as with heat, ice or relaxation.    Call a doctor for any of the followin. Signs of infection (fever, growing tenderness at the surgery site, a large amount of drainage or bleeding, severe pain, foul-smelling drainage, redness, swelling).  2. It has been over 8 to 10 hours since surgery and you are still not able to urinate (pass water).  3. Headache for over 24 hours.  4. Numbness, tingling or weakness the day after surgery (if you had spinal anesthesia).  Your doctor is:  Dr. Hanny Isaacs, ENT Otolaryngology: [phone]                    Or dial [phone] and ask for the resident on call for:  ENT Otolaryngology  For emergency care, call the:  [facility]:  [phone] (TTY for hearing impaired: [phone])

## 2017-06-20 NOTE — IP AVS SNAPSHOT
MRN:[id number]                      After Visit Summary   6/20/2017    Ed Cain    MRN: [id number]           Thank you!     Thank you for choosing [facility] for your care. Our goal is always to provide you with excellent care. Hearing back from our patients is one way we can continue to improve our services. Please take a few minutes to complete the written survey that you may receive in the mail after you visit with us. Thank you!        Patient Information     Date Of Birth          1950        About your hospital stay     You were admitted on:  June 20, 2017 You last received care in the:  [facility]    You were discharged on:  June 20, 2017       Who to Call     For medical emergencies, please call 911.  For non-urgent questions about your medical care, please call your primary care provider or clinic, [phone]  For questions related to your surgery, please call your surgery clinic        Attending Provider     Provider Specialty    Hanny Isaacs MD Otolaryngology       Primary Care Provider Office Phone # Fax #    Alfred Snowden [phone]      Your next 10 appointments already scheduled     Jul 10, 2017  2:00 PM CDT   (Arrive by 1:45 PM)   Return Visit with Hanny Isaacs MD   [facility] ([facility])    [address] [place] [id number] [phone]              Further instructions from your care team       1. Resume your home medications. Take pain medications as indicated. Use docusate to avoid constipation. Start your ear drops after the meatal plug is removed and use until your follow up with Dr. Isaacs.    2. Wound care  * Change the cotton ball in your ear as needed for drainage.  It's normal to expect some drainage from your ear for the first few days after surgery.    3. Use a cotton ball coated with vasoline to keep water out of ear canal.    4. For the next  "week, no heavy lifting more than 5 pounds, no strenuous activities. No nose blowing. Sneeze with your mouth open.    5. Please call MD or come to the ED for shortness of breath, trouble breathing, inability to tolerate liquids, intractable dizziness, or signs of infection such as fevers or redness.      Call the [phone] clinic number if you have any questions and concerns during the day and call [phone] at night and ask for \"ENT resident on call\".     6. Follow up with Dr. Isaacs as previously scheduled in 3 weeks.       [facility]  Home Care Following Anesthesia  For 24 hours after surgery:  1. Get plenty of rest.  A responsible adult must stay with you for at least 24 hours after you leave the surgery center.  2. Do not drive or use heavy equipment.  If you have weakness or tingling, don't drive or use heavy equipment until this feeling goes away.   3. Do not drink alcohol.   4. Avoid strenuous or risky activities.  Ask for help when climbing stairs.  5. You may feel lightheaded.  IF so, sit for a few minutes before standing.  Have someone help you get up.   6. If you have nausea (feel sick to your stomach): Drink only clear liquids such as apple juice, ginger ale, broth or 7-Up.  Rest may also help.  Be sure to drink enough fluids.  Move to a regular diet as you feel able.   7. You may have a slight fever.  Call the doctor if your fever is over 100 F (37.7 C) (taken under the tongue) or lasts longer than 24 hours.  8. You may have a dry mouth, a sore throat, muscle aches or trouble sleeping. These should go away after 24 hours.  9. Do not make important or legal decisions.               Tips for taking pain medications  To get the best pain relief possible, remember these points:    Take pain medications as directed, before pain becomes severe.    Pain medication can upset your stomach: taking it with food may help.    Constipation is a common side effect of pain " "medication. Drink plenty of  fluids.    Eat foods high in fiber. Take a stool softener if recommended by your doctor or pharmacist.    Do not drink alcohol, drive or operate machinery while taking pain medications.    Ask about other ways to control pain, such as with heat, ice or relaxation.    Call a doctor for any of the followin. Signs of infection (fever, growing tenderness at the surgery site, a large amount of drainage or bleeding, severe pain, foul-smelling drainage, redness, swelling).  2. It has been over 8 to 10 hours since surgery and you are still not able to urinate (pass water).  3. Headache for over 24 hours.  4. Numbness, tingling or weakness the day after surgery (if you had spinal anesthesia).  Your doctor is:  Dr. Hanny Isaacs, ENT Otolaryngology: [phone]                    Or dial [phone] and ask for the resident on call for:  ENT Otolaryngology  For emergency care, call the:  [facility]:  [phone] (TTY for hearing impaired: [phone])                    Pending Results     Date and Time Order Name Status Description    2017 1418 Surgical pathology exam In process             Admission Information     Date & Time Provider Department Dept. Phone    2017 Hanny Isaacs MD [facility] [phone]      Your Vitals Were     Blood Pressure Temperature Respirations Height Weight Pulse Oximetry    107/65 97.9  F (36.6  C) (Temporal) 16 1.803 m (5' 10.98\") 102.4 kg (225 lb 12.8 oz) 93%    BMI (Body Mass Index)                   31.51 kg/m2           [employer] Information     [employer] is an electronic gateway that provides easy, online access to your medical records. With [employer], you can request a clinic appointment, read your test results, renew a prescription or communicate with your care team.     To sign up for [employer] visit the website at [email or web address]   You will be asked to enter the access code listed below, " as well as some personal information. Please follow the directions to create your username and password.     Your access code is: [email or web address]  Expires: 2017  8:58 AM     Your access code will  in 90 days. If you need help or a new code, please contact your [facility] or call [phone] for assistance.        Care EveryWhere ID     This is your Care EveryWhere ID. This could be used by other organizations to access your [facility] medical records  [id number]           Review of your medicines      START taking        Dose / Directions    HYDROcodone-acetaminophen 5-325 MG per tablet   Commonly known as:  NORCO   Used for:  Postoperative pain        Dose:  1 tablet   Take 1 tablet by mouth every 4 hours as needed for moderate to severe pain   Quantity:  20 tablet   Refills:  0       ofloxacin 0.3 % otic solution   Commonly known as:  FLOXIN   Used for:  Cholesteatoma, right        Dose:  10 drop   Start taking on:  2017   Place 10 drops into the right ear 2 times daily for 14 days   Quantity:  20 mL   Refills:  0         CONTINUE these medicines which have NOT CHANGED        Dose / Directions    acetaminophen 500 MG tablet   Commonly known as:  TYLENOL        Dose:  500-1000 mg   Take 500-1,000 mg by mouth every 4 hours as needed   Refills:  0       amLODIPine-benazepril 2.5-10 MG per capsule   Commonly known as:  LOTREL        Dose:  1 capsule   Take 1 capsule by mouth every evening   Refills:  0       aspirin 81 MG chewable tablet        Dose:  81 mg   Take 81 mg by mouth every evening   Refills:  0       fish oil-omega-3 fatty acids 1000 MG capsule        Dose:  2 g   Take 2 g by mouth every evening   Refills:  0       simvastatin 40 MG tablet   Commonly known as:  ZOCOR        TAKE ONE TABLET BY MOUTH IN EVENING   Refills:  0       vitamin D 2000 UNITS tablet        Dose:  2000 Units   Take 2,000 Units by mouth every evening   Refills:  0       warfarin 2 MG tablet    Commonly known as:  COUMADIN        Lab 4/12/17: Take 6 mg daily. This dose may change.   Refills:  0         STOP taking     enoxaparin 80 MG/0.8ML injection   Commonly known as:  LOVENOX                Where to get your medicines      These medications were sent to [place], [place] - [address] [facility] [address] [facility] [address]  [address], [facility] [address]    Hours:  TRANSPLANT PHONE NUMBER [phone] Phone:  [phone]     ofloxacin 0.3 % otic solution         Some of these will need a paper prescription and others can be bought over the counter. Ask your nurse if you have questions.     Bring a paper prescription for each of these medications     HYDROcodone-acetaminophen 5-325 MG per tablet                Protect others around you: Learn how to safely use, store and throw away your medicines at www.disposemymeds.org.             Medication List: This is a list of all your medications and when to take them. Check marks below indicate your daily home schedule. Keep this list as a reference.      Medications           Morning Afternoon Evening Bedtime As Needed    acetaminophen 500 MG tablet   Commonly known as:  TYLENOL   Take 500-1,000 mg by mouth every 4 hours as needed   Last time this was given:  975 mg on 6/20/2017 12:05 PM                                amLODIPine-benazepril 2.5-10 MG per capsule   Commonly known as:  LOTREL   Take 1 capsule by mouth every evening                                aspirin 81 MG chewable tablet   Take 81 mg by mouth every evening                                fish oil-omega-3 fatty acids 1000 MG capsule   Take 2 g by mouth every evening                                HYDROcodone-acetaminophen 5-325 MG per tablet   Commonly known as:  NORCO   Take 1 tablet by mouth every 4 hours as needed for moderate to severe pain                                ofloxacin 0.3 % otic solution   Commonly known as:  FLOXIN   Place 10 drops into the  right ear 2 times daily for 14 days   Start taking on:  6/27/2017                                simvastatin 40 MG tablet   Commonly known as:  ZOCOR   TAKE ONE TABLET BY MOUTH IN EVENING                                vitamin D 2000 UNITS tablet   Take 2,000 Units by mouth every evening                                warfarin 2 MG tablet   Commonly known as:  COUMADIN   Lab 4/12/17: Take 6 mg daily. This dose may change.

## 2017-06-20 NOTE — ANESTHESIA CARE TRANSFER NOTE
Patient: Ed Cain    Procedure(s):  Conversion to Radical Mastoidectomy-Right Ear - Wound Class: II-Clean Contaminated    Diagnosis: Cholesteatoma  Diagnosis Additional Information: No value filed.    Anesthesia Type:   General, ETT     Note:  Airway :Face Mask  Patient transferred to:PACU  Comments: Arrive PACU, Stable, Airway Intact, Face mask O2.  97/60, 77,20,94,36.2      Vitals: (Last set prior to Anesthesia Care Transfer)    CRNA VITALS  6/20/2017 1411 - 6/20/2017 1446      6/20/2017             Pulse: 75    SpO2: 91 %    Resp Rate (set): 10                Electronically Signed By: MAGUI Gottlieb CRNA  June 20, 2017  2:46 PM

## 2017-06-20 NOTE — ANESTHESIA POSTPROCEDURE EVALUATION
Patient: Ed Cain    Procedure(s):  Conversion to Radical Mastoidectomy-Right Ear - Wound Class: II-Clean Contaminated    Diagnosis:Cholesteatoma  Diagnosis Additional Information: No value filed.    Anesthesia Type:  General, ETT    Note:  Anesthesia Post Evaluation    Patient location during evaluation: PACU  Patient participation: Able to fully participate in evaluation  Level of consciousness: awake  Pain management: adequate  Airway patency: patent  Cardiovascular status: acceptable  Respiratory status: acceptable  Hydration status: balanced  PONV: none     Anesthetic complications: None          Last vitals:  Vitals:    06/20/17 1445 06/20/17 1500 06/20/17 1515   BP: 97/60 106/54 107/65   Resp: 12 14 16   Temp: 36.2  C (97.2  F)  36.6  C (97.9  F)   SpO2: 95% 95% 93%         Electronically Signed By: Romero Breaux MD  June 20, 2017  4:05 PM

## 2017-06-22 NOTE — OP NOTE
Date of service:  6/20/17    Preoperative diagnosis:  Cholesteatoma and tympanic membrane perforation    Postoperative diagnosis:  Cholesteatoma and tympanic membrane perforation    Procedure:  1.  Right conversion to radical mastoidectomy  2.  Facial nerve monitoring x 1.5 hours    Surgeon:  Hanny Isaacs MD    Resident:  Saroj Valentin MD    Anesthesia:  General    EBL:  5cc    Specimens:  None    Complications:  None    Findings:  Large cholesteatoma eroding through the anterior temporal bone superior to the eustachian tube, old TORP shaft encased in cholesteatoma, stapes footplate intact and mobile, facial nerve dehiscent inferiorly along the tympanic segment.    Indications:  Ed Cain is a patient with a right tympanic membrane perforation and cholesteatoma status post multiple surgeries in the past for cholesteatoma.  Discussion was had about tympanoplasty.  Risks and benefits had been discussed and consent had been obtained.    Procedure:  The patient was taken to the operating room and placed supine on the operating room table.  General anesthesia was induced and endotracheal intubation was performed.  Time Out was then performed with confirmation of the patient, site and procedure.  Facial nerve electrodes were placed on the right side of the face.  Impedances were checked and the nerve was monitored for the entirety of the case.  1:100,000 epinephrine was injected into the lateral mastoid cavity.  The area was prepped and draped in standard surgical fashion.  The operating microscope was brought into position and used for the entirety of the case.  The mastoid cavity was inspected and irrigated with normal saline and cleaned.  The top of the hydroxyapatite prosthesis is noted to be extruding sideways through the tympanic membrane posteriorly and there is a perforation in the anterior superior quadrant with epithelium on the edges within the middle ear.  Incision was made posterior to the facial ridge  and carried around the entire tympanic membrane lateral to the annular ligament.  The skin and tympanic membrane were elevated and the middle ear was entered inferiorly.  The hypotympanum was clear.  As the dissection was carried superiorly, the end of the prosthesis was identified and noted to be surrounded by cholesteatoma.  This was dissected off the middle ear and a stapes footplate was identified and noted to be intact and mobile.  The inferior portion of the tympanic segment of the facial nerve was noted to be dehiscent.  As the dissection was carried into the epitympanum, the cholesteatoma was noted to be extending through the entire epitympanum with erosion anteriorly through the anterior wall of the middle ear.  The entire malleus was still present.  The tensor tympani tendon was cut.  The matrix was peeled off the medial tegmen which appeared intact.  The cholesteatoma eroded through the anterior temporal bone to the point where it was difficult to reach with a whirlybird.  The end appeared to be touching likely the muscles of the infratemporal fossa as it was soft to palpation with the end of the whirlybird.  The inferior tympanic membrane was elevated and the dissection carried anteriorly towards the cholesteatoma.  The inferior middle ear was clear and the annular ligament removed.  The eustachian tube orifice was identified and there was just a small amount of cholesteatoma superiorly in the orifice which was removed.  The mucosa lining the floor of the middle ear anteriorly and within the eustachian tube was noted to be edematous.  The dissection of the cholesteatoma was then carried into the opening of the anterior superior temporal bone.  The entire specimen with the cholesteatoma and tympanic membrane with the malleus attached was then removed.  The edematous mucosa was removed from the floor.  The middle ear was filled with gelfoam and several large pieces of gelfoam placed over this.  A cotton  ball was placed over the mastoid cavity and the facial nerve electrodes removed.  The patient tolerated the procedure well and counts were correct.  The patient was returned to Anesthesia, awakened, extubated and taken to the PACU in stable condition.

## 2017-06-23 LAB — COPATH REPORT: NORMAL

## 2017-07-19 ENCOUNTER — OFFICE VISIT (OUTPATIENT)
Dept: OTOLARYNGOLOGY | Facility: CLINIC | Age: 67
End: 2017-07-19

## 2017-07-19 VITALS — HEIGHT: 71 IN | BODY MASS INDEX: 31.5 KG/M2 | WEIGHT: 225 LBS

## 2017-07-19 DIAGNOSIS — H71.91 CHOLESTEATOMA, RIGHT: Primary | ICD-10-CM

## 2017-07-19 RX ORDER — OFLOXACIN 3 MG/ML
5 SOLUTION AURICULAR (OTIC) 2 TIMES DAILY
Qty: 7 ML | Refills: 0 | Status: SHIPPED | OUTPATIENT
Start: 2017-07-19 | End: 2017-08-02

## 2017-07-19 ASSESSMENT — PAIN SCALES - GENERAL: PAINLEVEL: NO PAIN (0)

## 2017-07-19 NOTE — PROGRESS NOTES
Ed Cain is seen for a postoperative check after conversion on the right from a canal wall down mastoid to a radical mastoid with removal of the tympanic membrane and cholesteatoma in the middle ear which was noted to have eroded anteriorly into the infratemporal fossa.  He says that there's been minimal pain and drainage and he feels totally fine.    Physical examination:  male in no acute distress.  Alert and answering questions appropriately.  HB 1/6 bilaterally.  Right ear examined under the microscope.  Gelfoam was suctioned from the anterior cavity.  Some of the floor of the middle ear was identified but the remainder still has gelfoam adherent to it.  I did suction off a little granulation tissue as well.  The anterior superior middle ear space with the cholesteatoma eroding towards the infratemporal fossa is still filled with gelfoam and I was unable to suction it free.    Assessment and plan:  I asked him to continue the Floxin drops for another 2 weeks and he will continue to keep his ear dry.  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  If the ear looks healed at that point, we'll get an audiogram and he will continue follow up with Dr. Rey after for routine mastoid cavity care.

## 2017-07-19 NOTE — MR AVS SNAPSHOT
After Visit Summary   2017    Ed Cain    MRN: [id number]           Patient Information     Date Of Birth          1950        Visit Information        Provider Department      2017 8:30 AM Hanny Isaacs MD [facility] Ear Nose and Throat        Today's Diagnoses     Cholesteatoma, right    -  1      Care Instructions    You will need  to schedule a follow up appointment in 3 months with a hearing test.  Please  the ear drop prescription at the pharmacy.   Please call our clinic for any questions,concerns,or worsening symptoms.      Clinic #[phone]       Option 3  for the triage nurse.          Follow-ups after your visit        Follow-up notes from your care team     Return in about 3 months (around 10/19/2017).      Who to contact     Please call your clinic at [phone] to:    Ask questions about your health    Make or cancel appointments    Discuss your medicines    Learn about your test results    Speak to your doctor   If you have compliments or concerns about an experience at your clinic, or if you wish to file a complaint, please contact [facility] Patient Relations at [phone] or email us at [email or web address]         Additional Information About Your Visit        MyChart Information     "[employer]hart is an electronic gateway that provides easy, online access to your medical records. With Notehall, you can request a clinic appointment, read your test results, renew a prescription or communicate with your care team.     To sign up for Valutaot visit the website at [email or web address]   You will be asked to enter the access code listed below, as well as some personal information. Please follow the directions to create your username and password.     Your access code is: [email or web address]  Expires: 2017  8:58 AM     Your access code will  in 90 days. If you need help or a new code, please contact your [facility] [place] [facility] or call [phone] for assistance.        Care EveryWhere ID     This is your Care EveryWhere ID. This could be used by other organizations to access your [place] medical records  [id number]        Your Vitals Were     Height BMI (Body Mass Index)                1.803 m (5' 10.98\") 31.4 kg/m2           Blood Pressure from Last 3 Encounters:   06/20/17 107/65   05/25/17 135/78    Weight from Last 3 Encounters:   07/19/17 102.1 kg (225 lb)   06/20/17 102.4 kg (225 lb 12.8 oz)   05/25/17 102.4 kg (225 lb 12.8 oz)              Today, you had the following     No orders found for display         Today's Medication Changes          These changes are accurate as of: 7/19/17  8:48 AM.  If you have any questions, ask your nurse or doctor.               Start taking these medicines.        Dose/Directions    ofloxacin 0.3 % otic solution   Commonly known as:  FLOXIN   Used for:  Cholesteatoma, right   Started by:  Hanny Isaacs MD        Dose:  5 drop   Place 5 drops into the right ear 2 times daily for 14 days   Quantity:  7 mL   Refills:  0            Where to get your medicines      These medications were sent to [facility]  [place] [facility] [address]  [address]  [place] [address]     Phone:  [phone]     ofloxacin 0.3 % otic solution                Primary Care Provider Office Phone # Fax #    Alfred Snowden [phone]       [facility] [place] [address] [facility] [address]        Equal Access to Services     LUIS PERKINS AH: Hadii jazmin ku hadasho Soomaali, waaxda luqadaha, qaybta kaalmamel chowdhury. So [facility] [phone].    ATENCIÓN: Si habla español, tiene a quinones disposición servicios gratuitos de asistencia lingüística. Llame al [phone].    We comply with applicable federal civil rights laws and [place] laws. We do not discriminate on the basis of race, color, national origin, age, disability " sex, sexual orientation or gender identity.            Thank you!     Thank you for choosing [facility] EAR NOSE AND THROAT  for your care. Our goal is always to provide you with excellent care. Hearing back from our patients is one way we can continue to improve our services. Please take a few minutes to complete the written survey that you may receive in the mail after your visit with us. Thank you!             Your Updated Medication List - Protect others around you: Learn how to safely use, store and throw away your medicines at www.disposemymeds.org.          This list is accurate as of: 7/19/17  8:48 AM.  Always use your most recent med list.                   Brand Name Dispense Instructions for use Diagnosis    acetaminophen 500 MG tablet    TYLENOL     Take 500-1,000 mg by mouth every 4 hours as needed        amLODIPine-benazepril 2.5-10 MG per capsule    LOTREL     Take 1 capsule by mouth every evening        aspirin 81 MG chewable tablet      Take 81 mg by mouth every evening        fish oil-omega-3 fatty acids 1000 MG capsule      Take 2 g by mouth every evening        HYDROcodone-acetaminophen 5-325 MG per tablet    NORCO    20 tablet    Take 1 tablet by mouth every 4 hours as needed for moderate to severe pain    Postoperative pain       ofloxacin 0.3 % otic solution    FLOXIN    7 mL    Place 5 drops into the right ear 2 times daily for 14 days    Cholesteatoma, right       simvastatin 40 MG tablet    ZOCOR     TAKE ONE TABLET BY MOUTH IN EVENING        vitamin D 2000 UNITS tablet      Take 2,000 Units by mouth every evening        warfarin 2 MG tablet    COUMADIN     Lab 4/12/17: Take 6 mg daily. This dose may change.

## 2017-07-19 NOTE — PATIENT INSTRUCTIONS
You will need  to schedule a follow up appointment in 3 months with a hearing test.  Please  the ear drop prescription at the pharmacy.   Please call our clinic for any questions,concerns,or worsening symptoms.      Clinic #[phone]       Option 3  for the triage nurse.

## 2017-07-19 NOTE — LETTER
7/19/2017      RE: Ed Cain  [address] [id number]       Ed Cain is seen for a postoperative check after conversion on the right from a canal wall down mastoid to a radical mastoid with removal of the tympanic membrane and cholesteatoma in the middle ear which was noted to have eroded anteriorly into the infratemporal fossa.  He says that there's been minimal pain and drainage and he feels totally fine.    Physical examination:  male in no acute distress.  Alert and answering questions appropriately.  HB 1/6 bilaterally.  Right ear examined under the microscope.  Gelfoam was suctioned from the anterior cavity.  Some of the floor of the middle ear was identified but the remainder still has gelfoam adherent to it.  I did suction off a little granulation tissue as well.  The anterior superior middle ear space with the cholesteatoma eroding towards the infratemporal fossa is still filled with gelfoam and I was unable to suction it free.    Assessment and plan:  I asked him to continue the Floxin drops for another 2 weeks and he will continue to keep his ear dry.  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  If the ear looks healed at that point, we'll get an audiogram and he will continue follow up with Dr. Rey after for routine mastoid cavity care.      Hanny Isaacs MD

## 2017-09-14 DIAGNOSIS — H91.90 HEARING LOSS: Primary | ICD-10-CM

## 2017-09-20 ENCOUNTER — OFFICE VISIT (OUTPATIENT)
Dept: AUDIOLOGY | Facility: CLINIC | Age: 67
End: 2017-09-20

## 2017-09-20 ENCOUNTER — OFFICE VISIT (OUTPATIENT)
Dept: OTOLARYNGOLOGY | Facility: CLINIC | Age: 67
End: 2017-09-20

## 2017-09-20 DIAGNOSIS — H90.A22 SENSORINEURAL HEARING LOSS (SNHL) OF LEFT EAR WITH RESTRICTED HEARING OF RIGHT EAR: ICD-10-CM

## 2017-09-20 DIAGNOSIS — H90.A31 MIXED CONDUCTIVE AND SENSORINEURAL HEARING LOSS OF RIGHT EAR WITH RESTRICTED HEARING OF LEFT EAR: ICD-10-CM

## 2017-09-20 DIAGNOSIS — H71.91 CHOLESTEATOMA, RIGHT: Primary | ICD-10-CM

## 2017-09-20 ASSESSMENT — PAIN SCALES - GENERAL: PAINLEVEL: NO PAIN (0)

## 2017-09-20 NOTE — MR AVS SNAPSHOT
After Visit Summary   2017    Ed Cain    MRN: [id number]           Patient Information     Date Of Birth          1950        Visit Information        Provider Department      2017 9:45 AM Hanny Isaacs MD [facility] Ear Nose and Throat        Today's Diagnoses     Cholesteatoma, right    -  1      Care Instructions    You may follow up with your local ENT.   Please call our clinic for any questions,concerns,or worsening symptoms.      Clinic #[phone]       Option 3  for the triage nurse.          Follow-ups after your visit        Follow-up notes from your care team     Return if symptoms worsen or fail to improve.      Who to contact     Please call your clinic at [phone] to:    Ask questions about your health    Make or cancel appointments    Discuss your medicines    Learn about your test results    Speak to your doctor   If you have compliments or concerns about an experience at your clinic, or if you wish to file a complaint, please contact [facility] Patient Relations at [phone] or email us at [email or web address]         Additional Information About Your Visit        MyChart Information     [employer] is an electronic gateway that provides easy, online access to your medical records. With [employer], you can request a clinic appointment, read your test results, renew a prescription or communicate with your care team.     To sign up for [employer] visit the website at [email or web address]   You will be asked to enter the access code listed below, as well as some personal information. Please follow the directions to create your username and password.     Your access code is: [email or web address]  Expires: 12/10/2017  6:30 AM     Your access code will  in 90 days. If you need help or a new code, please contact your [facility] or call [phone] for assistance.        Care EveryWhere ID     This  is your Care EveryWhere ID. This could be used by other organizations to access your [place] medical records  [id number]         Blood Pressure from Last 3 Encounters:   06/20/17 107/65   05/25/17 135/78    Weight from Last 3 Encounters:   07/19/17 102.1 kg (225 lb)   06/20/17 102.4 kg (225 lb 12.8 oz)   05/25/17 102.4 kg (225 lb 12.8 oz)              Today, you had the following     No orders found for display       Primary Care Provider Office Phone # Fax #    Alfred Snowden [phone]       [facility] [address]        Equal Access to Services     LUIS PERKINS : Aidan aparicioo Sola, waaxda luqadaha, qaybta kaalmada aderichardyamona, mel fitzpatrick. So [facility] [phone].    ATENCIÓN: Si habla español, tiene a quinones disposición servicios gratuitos de asistencia lingüística. Llame al [phone].    We comply with applicable federal civil rights laws and [place] laws. We do not discriminate on the basis of race, color, national origin, age, disability sex, sexual orientation or gender identity.            Thank you!     Thank you for choosing [facility] EAR NOSE AND THROAT  for your care. Our goal is always to provide you with excellent care. Hearing back from our patients is one way we can continue to improve our services. Please take a few minutes to complete the written survey that you may receive in the mail after your visit with us. Thank you!             Your Updated Medication List - Protect others around you: Learn how to safely use, store and throw away your medicines at www.disposemymeds.org.          This list is accurate as of: 9/20/17 11:59 PM.  Always use your most recent med list.                   Brand Name Dispense Instructions for use Diagnosis    acetaminophen 500 MG tablet    TYLENOL     Take 500-1,000 mg by mouth every 4 hours as needed        amLODIPine-benazepril 2.5-10 MG per capsule    LOTREL     Take 1  capsule by mouth every evening        aspirin 81 MG chewable tablet      Take 81 mg by mouth every evening        fish oil-omega-3 fatty acids 1000 MG capsule      Take 2 g by mouth every evening        simvastatin 40 MG tablet    ZOCOR     TAKE ONE TABLET BY MOUTH IN EVENING        vitamin D 2000 UNITS tablet      Take 2,000 Units by mouth every evening        warfarin 2 MG tablet    COUMADIN     Lab 4/12/17: Take 6 mg daily. This dose may change.

## 2017-09-20 NOTE — PATIENT INSTRUCTIONS
You may follow up with your local ENT.   Please call our clinic for any questions,concerns,or worsening symptoms.      Clinic #[phone]       Option 3  for the triage nurse.

## 2017-09-20 NOTE — PROGRESS NOTES
Ed Cain is seen for a 3 month postoperative check after conversion to a right radical mastoidectomy for cholesteatoma going anteriorly into the infratemporal fossa.  He reports no change in hearing, no otorrhea and no otalgia.    Physical examination:  male in no acute distress.  Alert and answering questions appropriately.  HB 1/6 bilaterally.  Right ear examined under the microscope.  Cavity debrided of cerumen, dried skin and crust from the posterior cavity.  The anterior cavity is healthy.  The tract that was going towards infratemporal fossa has healed over and there is cholesteatoma underneath the epithelium.  This was opened with a straight pick and cholesteatoma suctioned out of the tract.  He tolerated this very well.    Audiogram:  Stable right severe mixed loss, 88% speech discrimination and left normal downsloping to severe sensorineural hearing loss with 92% speech discrimination.    Assessment and plan:  Residual cholesteatoma within the anterior tract.  I discussed with them that he can return to Dr. Rey for mastoid bowl debridement and that Dr. Rey will need to unroof the anterior tract and suction out the squamous debris when he has his usual debridement and that as long as the cholesteatoma is suctioned out, he should not require a return to the operating room.  They expressed understanding.

## 2017-09-20 NOTE — LETTER
9/20/2017      RE: Ed Cain  [address] [facility] [id number]       Ed Cain is seen for a 3 month postoperative check after conversion to a right radical mastoidectomy for cholesteatoma going anteriorly into the infratemporal fossa.  He reports no change in hearing, no otorrhea and no otalgia.    Physical examination:  male in no acute distress.  Alert and answering questions appropriately.  HB 1/6 bilaterally.  Right ear examined under the microscope.  Cavity debrided of cerumen, dried skin and crust from the posterior cavity.  The anterior cavity is healthy.  The tract that was going towards infratemporal fossa has healed over and there is cholesteatoma underneath the epithelium.  This was opened with a straight pick and cholesteatoma suctioned out of the tract.  He tolerated this very well.    Audiogram:  Stable right severe mixed loss, 88% speech discrimination and left normal downsloping to severe sensorineural hearing loss with 92% speech discrimination.    Assessment and plan:  Residual cholesteatoma within the anterior tract.  I discussed with them that he can return to Dr. eRy for mastoid bowl debridement and that Dr. Rey will need to unroof the anterior tract and suction out the squamous debris when he has his usual debridement and that as long as the cholesteatoma is suctioned out, he should not require a return to the operating room.  They expressed understanding.    Hanny Isaacs MD

## 2017-09-20 NOTE — PROGRESS NOTES
AUDIOLOGY REPORT    SUMMARY: Audiology visit completed. See audiogram for results.      RECOMMENDATIONS: Follow-up with ENT.    Maria L Lynn  Licensed Audiologist  [place] License #[id number]

## 2017-09-20 NOTE — MR AVS SNAPSHOT
After Visit Summary   2017    Ed Cain    MRN: [id number]           Patient Information     Date Of Birth          1950        Visit Information        Provider Department      2017 9:00 AM Pam Carrero [facility] Audiology        Today's Diagnoses     Mixed conductive and sensorineural hearing loss of right ear with restricted hearing of left ear        Sensorineural hearing loss (SNHL) of left ear with restricted hearing of right ear           Follow-ups after your visit        Your next 10 appointments already scheduled     Sep 20, 2017  9:45 AM CDT   (Arrive by 9:30 AM)   Return Visit with MD BJ Witt [facility] ([facility] [email or web address] and [facility])    [address] [place] [id number] [phone]              Who to contact     Please call your clinic at [phone] to:    Ask questions about your health    Make or cancel appointments    Discuss your medicines    Learn about your test results    Speak to your doctor   If you have compliments or concerns about an experience at your clinic, or if you wish to file a complaint, please contact [facility] Patient Relations at [phone] or email us at [email or web address]         Additional Information About Your Visit        MyChart Information     Sovicellt is an electronic gateway that provides easy, online access to your medical records. With Apptive, you can request a clinic appointment, read your test results, renew a prescription or communicate with your care team.     To sign up for Sov[employer]ellt visit the website at [email or web address]   You will be asked to enter the access code listed below, as well as some personal information. Please follow the directions to create your username and password.     Your access code is: [email or web address]  Expires: 12/10/2017  6:30 AM     Your access code will  in 90 days. If you need help  or a new code, please contact your [facility] or call [phone] for assistance.        Care EveryWhere ID     This is your Care EveryWhere ID. This could be used by other organizations to access your [facility] medical records  [id number]         Blood Pressure from Last 3 Encounters:   06/20/17 107/65   05/25/17 135/78    Weight from Last 3 Encounters:   07/19/17 102.1 kg (225 lb)   06/20/17 102.4 kg (225 lb 12.8 oz)   05/25/17 102.4 kg (225 lb 12.8 oz)              We Performed the Following     AUDIOGRAM/TYMPANOGRAM - INTERFACE     [facility] Speech Recog ([address])     Tymps / Reflex   ([address])        Primary Care Provider Office Phone # Fax #    Alfred Snowden [phone]       [address] [address]        Equal Access to Services     LUIS PERKINS : Hadii aad ku hadasho Soomaali, waaxda luqadaha, qaybta kaalmada adeegyada, waxay idiin hayaan simoneg alex fitzpatrick. So [facility] [phone].    ATENCIÓN: Si umairla español, tiene a quinones disposición servicios gratuitos de asistencia lingüística. Llame al [phone].    We comply with applicable federal civil rights laws and [place] laws. We do not discriminate on the basis of race, color, national origin, age, disability sex, sexual orientation or gender identity.            Thank you!     Thank you for choosing [facility] AUDIOLOGY  for your care. Our goal is always to provide you with excellent care. Hearing back from our patients is one way we can continue to improve our services. Please take a few minutes to complete the written survey that you may receive in the mail after your visit with us. Thank you!             Your Updated Medication List - Protect others around you: Learn how to safely use, store and throw away your medicines at www.disposemymeds.org.          This list is accurate as of: 9/20/17  9:30 AM.  Always use your most recent med list.                    Brand Name Dispense Instructions for use Diagnosis    acetaminophen 500 MG tablet    TYLENOL     Take 500-1,000 mg by mouth every 4 hours as needed        amLODIPine-benazepril 2.5-10 MG per capsule    LOTREL     Take 1 capsule by mouth every evening        aspirin 81 MG chewable tablet      Take 81 mg by mouth every evening        fish oil-omega-3 fatty acids 1000 MG capsule      Take 2 g by mouth every evening        HYDROcodone-acetaminophen 5-325 MG per tablet    NORCO    20 tablet    Take 1 tablet by mouth every 4 hours as needed for moderate to severe pain    Postoperative pain       simvastatin 40 MG tablet    ZOCOR     TAKE ONE TABLET BY MOUTH IN EVENING        vitamin D 2000 UNITS tablet      Take 2,000 Units by mouth every evening        warfarin 2 MG tablet    COUMADIN     Lab 4/12/17: Take 6 mg daily. This dose may change.

## (undated) DEVICE — NIM ELEC SUBDERMAL NDL 3PAIR/BOX

## (undated) DEVICE — WIPE INSTRUMENT MEROCEL 400200

## (undated) DEVICE — PREP POVIDONE IODINE SOLUTION 10% 120ML

## (undated) DEVICE — DRSG TEGADERM 2 3/8X2 3/4" 1624W

## (undated) DEVICE — BLADE KNIFE BEAVER MINI BEAVER6400

## (undated) DEVICE — SPONGE SURGIFOAM 100 1974

## (undated) DEVICE — DRSG BANDAID 3/4X3"

## (undated) DEVICE — DRSG GLASSCOCK ADULT S-1000

## (undated) DEVICE — PREP PAD ALCOHOL 6818

## (undated) DEVICE — PREP SKIN SCRUB TRAY 4461A

## (undated) DEVICE — LINEN TOWEL PACK X5 5464

## (undated) DEVICE — PREP POVIDONE IODINE SCRUB 7.5% 120ML

## (undated) DEVICE — TUBING STRYKER IRRIGATION CASSETTE 5400-050-001

## (undated) DEVICE — PACK EAR CUSTOM ASC

## (undated) DEVICE — DRAPE WARMER 66X44" ORS-300

## (undated) DEVICE — BLADE KNIFE BEAVER TYMPANOPLASTY 2.5MM W/60D DOWN 377200

## (undated) DEVICE — GLOVE PROTEXIS POWDER FREE SMT 6.5  2D72PT65X

## (undated) DEVICE — STRAP UNIVERSAL POSITIONING 2-PIECE 4X47X76" 91-287

## (undated) DEVICE — DRAPE MICROSCOPE LEICA 46X120" AR8033651

## (undated) DEVICE — BONE WAX 2.5GM W31G

## (undated) DEVICE — SUCTION MANIFOLD NEPTUNE 2 SYS 4 PORT 0702-020-000

## (undated) DEVICE — LABEL MEDICATION SYSTEM 3303-P

## (undated) DEVICE — SU VICRYL 3-0 X-1 27" UND J458H

## (undated) DEVICE — SOL WATER IRRIG 1000ML BOTTLE 2F7114

## (undated) DEVICE — DRSG COTTON BALL 6PK LCB62

## (undated) DEVICE — SYR 10ML LL W/O NDL

## (undated) DEVICE — ESU ELEC BLADE 2.75" COATED/INSULATED E1455

## (undated) DEVICE — DRAPE EAR CHRONIC LATEX FREE 3609

## (undated) DEVICE — ESU GROUND PAD ADULT W/CORD E7507

## (undated) DEVICE — SOL NACL 0.9% IRRIG 1000ML BOTTLE 2F7124

## (undated) RX ORDER — PROPOFOL 10 MG/ML
INJECTION, EMULSION INTRAVENOUS
Status: DISPENSED
Start: 2017-06-20

## (undated) RX ORDER — GABAPENTIN 300 MG/1
CAPSULE ORAL
Status: DISPENSED
Start: 2017-06-20

## (undated) RX ORDER — EPINEPHRINE NASAL SOLUTION 1 MG/ML
SOLUTION NASAL
Status: DISPENSED
Start: 2017-06-20

## (undated) RX ORDER — ONDANSETRON 2 MG/ML
INJECTION INTRAMUSCULAR; INTRAVENOUS
Status: DISPENSED
Start: 2017-06-20

## (undated) RX ORDER — DEXAMETHASONE SODIUM PHOSPHATE 10 MG/ML
INJECTION, SOLUTION INTRAMUSCULAR; INTRAVENOUS
Status: DISPENSED
Start: 2017-06-20

## (undated) RX ORDER — DEXAMETHASONE SODIUM PHOSPHATE 4 MG/ML
INJECTION, SOLUTION INTRA-ARTICULAR; INTRALESIONAL; INTRAMUSCULAR; INTRAVENOUS; SOFT TISSUE
Status: DISPENSED
Start: 2017-06-20

## (undated) RX ORDER — BACITRACIN 500 [USP'U]/G
OINTMENT OPHTHALMIC
Status: DISPENSED
Start: 2017-06-20

## (undated) RX ORDER — PHENYLEPHRINE HCL IN 0.9% NACL 1 MG/10 ML
SYRINGE (ML) INTRAVENOUS
Status: DISPENSED
Start: 2017-06-20

## (undated) RX ORDER — CLINDAMYCIN PHOSPHATE 900 MG/50ML
INJECTION, SOLUTION INTRAVENOUS
Status: DISPENSED
Start: 2017-06-20

## (undated) RX ORDER — FENTANYL CITRATE 50 UG/ML
INJECTION, SOLUTION INTRAMUSCULAR; INTRAVENOUS
Status: DISPENSED
Start: 2017-06-20

## (undated) RX ORDER — EPHEDRINE SULFATE 50 MG/ML
INJECTION, SOLUTION INTRAMUSCULAR; INTRAVENOUS; SUBCUTANEOUS
Status: DISPENSED
Start: 2017-06-20

## (undated) RX ORDER — ACETAMINOPHEN 325 MG/1
TABLET ORAL
Status: DISPENSED
Start: 2017-06-20